# Patient Record
Sex: MALE | Race: WHITE | Employment: FULL TIME | URBAN - METROPOLITAN AREA
[De-identification: names, ages, dates, MRNs, and addresses within clinical notes are randomized per-mention and may not be internally consistent; named-entity substitution may affect disease eponyms.]

---

## 2017-02-13 ENCOUNTER — OFFICE VISIT (OUTPATIENT)
Dept: FAMILY MEDICINE CLINIC | Age: 52
End: 2017-02-13

## 2017-02-13 VITALS
HEIGHT: 67 IN | BODY MASS INDEX: 34.21 KG/M2 | TEMPERATURE: 97.9 F | HEART RATE: 166 BPM | SYSTOLIC BLOOD PRESSURE: 128 MMHG | WEIGHT: 218 LBS | RESPIRATION RATE: 20 BRPM | DIASTOLIC BLOOD PRESSURE: 84 MMHG

## 2017-02-13 DIAGNOSIS — J45.41 RAD (REACTIVE AIRWAY DISEASE), MODERATE PERSISTENT, WITH ACUTE EXACERBATION: ICD-10-CM

## 2017-02-13 DIAGNOSIS — R79.89 LOW VITAMIN D LEVEL: ICD-10-CM

## 2017-02-13 DIAGNOSIS — R06.02 SHORTNESS OF BREATH: Primary | ICD-10-CM

## 2017-02-13 DIAGNOSIS — G20 PARKINSON'S DISEASE (HCC): ICD-10-CM

## 2017-02-13 DIAGNOSIS — R13.12 OROPHARYNGEAL DYSPHAGIA: ICD-10-CM

## 2017-02-13 PROCEDURE — 99203 OFFICE O/P NEW LOW 30 MIN: CPT | Performed by: FAMILY MEDICINE

## 2017-02-13 RX ORDER — BUDESONIDE AND FORMOTEROL FUMARATE DIHYDRATE 80; 4.5 UG/1; UG/1
2 AEROSOL RESPIRATORY (INHALATION) 2 TIMES DAILY
Qty: 1 INHALER | Refills: 3 | Status: SHIPPED | OUTPATIENT
Start: 2017-02-13 | End: 2017-07-06

## 2017-02-13 RX ORDER — ALBUTEROL SULFATE 90 UG/1
2 AEROSOL, METERED RESPIRATORY (INHALATION) EVERY 6 HOURS PRN
Qty: 1 INHALER | Refills: 3 | Status: SHIPPED | OUTPATIENT
Start: 2017-02-13 | End: 2017-07-06 | Stop reason: SDUPTHER

## 2017-02-13 ASSESSMENT — ENCOUNTER SYMPTOMS
VOMITING: 0
COUGH: 1
EYE PAIN: 0
DIARRHEA: 0
BLOOD IN STOOL: 0
ABDOMINAL PAIN: 0
SHORTNESS OF BREATH: 1
NAUSEA: 0
WHEEZING: 1
TROUBLE SWALLOWING: 1
CONSTIPATION: 0

## 2017-02-16 ENCOUNTER — TELEPHONE (OUTPATIENT)
Dept: FAMILY MEDICINE CLINIC | Age: 52
End: 2017-02-16

## 2017-02-16 DIAGNOSIS — G47.9 SLEEP DIFFICULTIES: Primary | ICD-10-CM

## 2017-02-16 DIAGNOSIS — R06.02 SHORTNESS OF BREATH: ICD-10-CM

## 2017-03-03 RX ORDER — ROPINIROLE 12 MG/1
TABLET, FILM COATED, EXTENDED RELEASE ORAL
Qty: 30 TABLET | Refills: 3 | Status: SHIPPED | OUTPATIENT
Start: 2017-03-03 | End: 2017-05-15 | Stop reason: SDUPTHER

## 2017-03-03 RX ORDER — ROPINIROLE 12 MG/1
TABLET, FILM COATED, EXTENDED RELEASE ORAL
Refills: 0 | COMMUNITY
Start: 2017-01-14 | End: 2017-03-03 | Stop reason: SDUPTHER

## 2017-03-03 RX ORDER — BACLOFEN 10 MG/1
TABLET ORAL
Qty: 30 TABLET | Refills: 3 | Status: SHIPPED | OUTPATIENT
Start: 2017-03-03 | End: 2017-05-15 | Stop reason: SDUPTHER

## 2017-03-03 RX ORDER — ATORVASTATIN CALCIUM 40 MG/1
TABLET, FILM COATED ORAL
Qty: 30 TABLET | Refills: 3 | Status: SHIPPED | OUTPATIENT
Start: 2017-03-03 | End: 2017-05-15 | Stop reason: SDUPTHER

## 2017-03-03 RX ORDER — ATORVASTATIN CALCIUM 40 MG/1
TABLET, FILM COATED ORAL
Refills: 0 | COMMUNITY
Start: 2017-02-17 | End: 2017-03-03 | Stop reason: SDUPTHER

## 2017-03-03 RX ORDER — BACLOFEN 10 MG/1
TABLET ORAL
Refills: 0 | COMMUNITY
Start: 2017-01-14 | End: 2017-03-03 | Stop reason: SDUPTHER

## 2017-03-03 RX ORDER — CARBIDOPA, LEVODOPA AND ENTACAPONE 25; 200; 100 MG/1; MG/1; MG/1
1 TABLET, FILM COATED ORAL DAILY
Qty: 90 TABLET | Refills: 3 | Status: SHIPPED | OUTPATIENT
Start: 2017-03-03 | End: 2017-05-15 | Stop reason: SDUPTHER

## 2017-03-24 ENCOUNTER — TELEPHONE (OUTPATIENT)
Dept: FAMILY MEDICINE CLINIC | Age: 52
End: 2017-03-24

## 2017-03-27 ENCOUNTER — OFFICE VISIT (OUTPATIENT)
Dept: FAMILY MEDICINE CLINIC | Age: 52
End: 2017-03-27

## 2017-03-27 VITALS
SYSTOLIC BLOOD PRESSURE: 139 MMHG | HEIGHT: 66 IN | WEIGHT: 220.2 LBS | RESPIRATION RATE: 20 BRPM | HEART RATE: 73 BPM | TEMPERATURE: 97.5 F | BODY MASS INDEX: 35.39 KG/M2 | DIASTOLIC BLOOD PRESSURE: 93 MMHG

## 2017-03-27 DIAGNOSIS — G20 PARKINSON DISEASE (HCC): ICD-10-CM

## 2017-03-27 DIAGNOSIS — J30.9 ALLERGIC RHINITIS, UNSPECIFIED ALLERGIC RHINITIS TRIGGER, UNSPECIFIED RHINITIS SEASONALITY: Primary | ICD-10-CM

## 2017-03-27 PROCEDURE — 99214 OFFICE O/P EST MOD 30 MIN: CPT | Performed by: FAMILY MEDICINE

## 2017-03-27 RX ORDER — FLUTICASONE PROPIONATE 50 MCG
1 SPRAY, SUSPENSION (ML) NASAL DAILY
Qty: 1 BOTTLE | Refills: 3 | Status: SHIPPED | OUTPATIENT
Start: 2017-03-27 | End: 2017-07-06

## 2017-03-27 RX ORDER — LORATADINE 10 MG/1
20 TABLET ORAL DAILY
Qty: 60 TABLET | Refills: 5 | Status: SHIPPED | OUTPATIENT
Start: 2017-03-27 | End: 2017-07-06

## 2017-03-27 ASSESSMENT — ENCOUNTER SYMPTOMS
BLOOD IN STOOL: 0
ABDOMINAL PAIN: 0
CONSTIPATION: 0
VOMITING: 0
DIARRHEA: 0
COUGH: 0
NAUSEA: 0
TROUBLE SWALLOWING: 0
EYE PAIN: 0
SHORTNESS OF BREATH: 0

## 2017-03-28 PROBLEM — G20 PARKINSON DISEASE (HCC): Status: ACTIVE | Noted: 2017-03-28

## 2017-03-28 PROBLEM — J30.9 ALLERGIC RHINITIS: Status: ACTIVE | Noted: 2017-03-28

## 2017-03-29 ENCOUNTER — TELEPHONE (OUTPATIENT)
Dept: FAMILY MEDICINE CLINIC | Age: 52
End: 2017-03-29

## 2017-03-29 DIAGNOSIS — E55.9 VITAMIN D DEFICIENCY: ICD-10-CM

## 2017-03-29 DIAGNOSIS — E78.00 PURE HYPERCHOLESTEROLEMIA: Primary | ICD-10-CM

## 2017-03-29 RX ORDER — MULTIVIT-MIN/IRON/FOLIC ACID/K 18-600-40
CAPSULE ORAL
Qty: 60 CAPSULE | Refills: 5 | Status: SHIPPED | OUTPATIENT
Start: 2017-03-29 | End: 2017-05-15

## 2017-03-29 RX ORDER — OMEGA-3-ACID ETHYL ESTERS 1 G/1
2 CAPSULE, LIQUID FILLED ORAL 2 TIMES DAILY
Qty: 60 CAPSULE | Refills: 3 | Status: SHIPPED | OUTPATIENT
Start: 2017-03-29 | End: 2017-05-15 | Stop reason: SDUPTHER

## 2017-05-15 ENCOUNTER — OFFICE VISIT (OUTPATIENT)
Dept: FAMILY MEDICINE CLINIC | Age: 52
End: 2017-05-15

## 2017-05-15 VITALS
TEMPERATURE: 97.8 F | BODY MASS INDEX: 34.38 KG/M2 | RESPIRATION RATE: 12 BRPM | SYSTOLIC BLOOD PRESSURE: 137 MMHG | WEIGHT: 213 LBS | DIASTOLIC BLOOD PRESSURE: 63 MMHG | HEART RATE: 84 BPM

## 2017-05-15 DIAGNOSIS — E78.2 MIXED HYPERLIPIDEMIA: ICD-10-CM

## 2017-05-15 DIAGNOSIS — E55.9 VITAMIN D DEFICIENCY: ICD-10-CM

## 2017-05-15 DIAGNOSIS — R25.2 MUSCLE CRAMPS: ICD-10-CM

## 2017-05-15 DIAGNOSIS — G20 PARKINSON DISEASE (HCC): Primary | ICD-10-CM

## 2017-05-15 DIAGNOSIS — E78.00 PURE HYPERCHOLESTEROLEMIA: ICD-10-CM

## 2017-05-15 DIAGNOSIS — R06.02 SHORTNESS OF BREATH: ICD-10-CM

## 2017-05-15 PROCEDURE — 99214 OFFICE O/P EST MOD 30 MIN: CPT | Performed by: FAMILY MEDICINE

## 2017-05-15 RX ORDER — TOCOPHERSOLAN (VITAMIN E TPGS) 400/15ML
LIQUID (ML) ORAL
Qty: 90 TABLET | Refills: 5 | Status: SHIPPED | OUTPATIENT
Start: 2017-05-15 | End: 2017-07-06

## 2017-05-15 RX ORDER — OMEGA-3-ACID ETHYL ESTERS 1 G/1
2 CAPSULE, LIQUID FILLED ORAL 2 TIMES DAILY
Qty: 180 CAPSULE | Refills: 1 | Status: SHIPPED | OUTPATIENT
Start: 2017-05-15 | End: 2017-10-16 | Stop reason: SDUPTHER

## 2017-05-15 RX ORDER — CARBIDOPA, LEVODOPA AND ENTACAPONE 25; 200; 100 MG/1; MG/1; MG/1
1 TABLET, FILM COATED ORAL DAILY
Qty: 90 TABLET | Refills: 3 | Status: SHIPPED | OUTPATIENT
Start: 2017-05-15 | End: 2017-10-16 | Stop reason: SDUPTHER

## 2017-05-15 RX ORDER — ROPINIROLE 12 MG/1
TABLET, FILM COATED, EXTENDED RELEASE ORAL
Qty: 90 TABLET | Refills: 1 | Status: SHIPPED | OUTPATIENT
Start: 2017-05-15 | End: 2017-10-16 | Stop reason: SDUPTHER

## 2017-05-15 RX ORDER — BACLOFEN 10 MG/1
TABLET ORAL
Qty: 90 TABLET | Refills: 3 | Status: SHIPPED | OUTPATIENT
Start: 2017-05-15 | End: 2017-10-16 | Stop reason: SDUPTHER

## 2017-05-15 RX ORDER — ATORVASTATIN CALCIUM 20 MG/1
TABLET, FILM COATED ORAL
Qty: 90 TABLET | Refills: 1 | Status: SHIPPED | OUTPATIENT
Start: 2017-05-15 | End: 2017-10-16 | Stop reason: SDUPTHER

## 2017-05-16 ASSESSMENT — ENCOUNTER SYMPTOMS
TROUBLE SWALLOWING: 0
ABDOMINAL PAIN: 0
EYE PAIN: 0
CONSTIPATION: 0
COUGH: 0
VOMITING: 0
NAUSEA: 0
SHORTNESS OF BREATH: 0
BLOOD IN STOOL: 0
DIARRHEA: 0

## 2017-07-06 ENCOUNTER — OFFICE VISIT (OUTPATIENT)
Dept: PULMONOLOGY | Age: 52
End: 2017-07-06

## 2017-07-06 VITALS
TEMPERATURE: 98.1 F | SYSTOLIC BLOOD PRESSURE: 130 MMHG | BODY MASS INDEX: 32.58 KG/M2 | HEART RATE: 67 BPM | WEIGHT: 215 LBS | DIASTOLIC BLOOD PRESSURE: 84 MMHG | OXYGEN SATURATION: 98 % | HEIGHT: 68 IN

## 2017-07-06 DIAGNOSIS — G20 PARKINSON DISEASE (HCC): ICD-10-CM

## 2017-07-06 DIAGNOSIS — J45.41 RAD (REACTIVE AIRWAY DISEASE), MODERATE PERSISTENT, WITH ACUTE EXACERBATION: ICD-10-CM

## 2017-07-06 DIAGNOSIS — R06.02 SHORTNESS OF BREATH: ICD-10-CM

## 2017-07-06 DIAGNOSIS — J45.20 MILD INTERMITTENT ASTHMA WITHOUT COMPLICATION: Primary | ICD-10-CM

## 2017-07-06 PROCEDURE — 99204 OFFICE O/P NEW MOD 45 MIN: CPT | Performed by: INTERNAL MEDICINE

## 2017-07-06 RX ORDER — BUDESONIDE AND FORMOTEROL FUMARATE DIHYDRATE 80; 4.5 UG/1; UG/1
2 AEROSOL RESPIRATORY (INHALATION) 2 TIMES DAILY
COMMUNITY
End: 2017-11-09 | Stop reason: SDUPTHER

## 2017-07-06 RX ORDER — ALBUTEROL SULFATE 90 UG/1
2 AEROSOL, METERED RESPIRATORY (INHALATION) EVERY 6 HOURS PRN
Qty: 1 INHALER | Refills: 3 | Status: SHIPPED | OUTPATIENT
Start: 2017-07-06 | End: 2018-07-10 | Stop reason: SDUPTHER

## 2017-07-06 RX ORDER — DILTIAZEM HYDROCHLORIDE 60 MG/1
2 TABLET, FILM COATED ORAL 2 TIMES DAILY
Qty: 1 INHALER | Refills: 3
Start: 2017-07-06 | End: 2017-10-16 | Stop reason: CLARIF

## 2017-07-06 ASSESSMENT — ENCOUNTER SYMPTOMS
CHEST TIGHTNESS: 0
APNEA: 0
COUGH: 1
SHORTNESS OF BREATH: 0
STRIDOR: 0
PHOTOPHOBIA: 0
SINUS PRESSURE: 0
VOMITING: 0
ABDOMINAL PAIN: 0
WHEEZING: 1
CHOKING: 0
BLOOD IN STOOL: 0
VOICE CHANGE: 0
RHINORRHEA: 0
ABDOMINAL DISTENTION: 0
CONSTIPATION: 0
FACIAL SWELLING: 0
BACK PAIN: 0

## 2017-10-16 ENCOUNTER — OFFICE VISIT (OUTPATIENT)
Dept: FAMILY MEDICINE CLINIC | Age: 52
End: 2017-10-16
Payer: COMMERCIAL

## 2017-10-16 VITALS
SYSTOLIC BLOOD PRESSURE: 138 MMHG | WEIGHT: 220.4 LBS | HEART RATE: 80 BPM | TEMPERATURE: 97.5 F | BODY MASS INDEX: 33.4 KG/M2 | DIASTOLIC BLOOD PRESSURE: 88 MMHG | HEIGHT: 68 IN | OXYGEN SATURATION: 98 %

## 2017-10-16 DIAGNOSIS — E78.2 MIXED HYPERLIPIDEMIA: ICD-10-CM

## 2017-10-16 DIAGNOSIS — E55.9 VITAMIN D DEFICIENCY: ICD-10-CM

## 2017-10-16 DIAGNOSIS — G20 PARKINSON DISEASE (HCC): ICD-10-CM

## 2017-10-16 DIAGNOSIS — E78.00 PURE HYPERCHOLESTEROLEMIA: ICD-10-CM

## 2017-10-16 DIAGNOSIS — M16.10 HIP ARTHRITIS: Primary | ICD-10-CM

## 2017-10-16 PROCEDURE — 99214 OFFICE O/P EST MOD 30 MIN: CPT | Performed by: FAMILY MEDICINE

## 2017-10-16 RX ORDER — BACLOFEN 10 MG/1
TABLET ORAL
Qty: 90 TABLET | Refills: 3 | Status: SHIPPED | OUTPATIENT
Start: 2017-10-16 | End: 2018-07-10 | Stop reason: SDUPTHER

## 2017-10-16 RX ORDER — ROPINIROLE 12 MG/1
TABLET, FILM COATED, EXTENDED RELEASE ORAL
Qty: 90 TABLET | Refills: 1 | Status: SHIPPED | OUTPATIENT
Start: 2017-10-16 | End: 2018-07-30 | Stop reason: SDUPTHER

## 2017-10-16 RX ORDER — OMEGA-3-ACID ETHYL ESTERS 1 G/1
2 CAPSULE, LIQUID FILLED ORAL 2 TIMES DAILY
Qty: 180 CAPSULE | Refills: 1 | Status: SHIPPED | OUTPATIENT
Start: 2017-10-16 | End: 2018-02-13 | Stop reason: SDUPTHER

## 2017-10-16 RX ORDER — CARBIDOPA, LEVODOPA AND ENTACAPONE 25; 200; 100 MG/1; MG/1; MG/1
1 TABLET, FILM COATED ORAL DAILY
Qty: 90 TABLET | Refills: 3 | Status: SHIPPED | OUTPATIENT
Start: 2017-10-16 | End: 2018-04-03

## 2017-10-16 RX ORDER — ATORVASTATIN CALCIUM 20 MG/1
TABLET, FILM COATED ORAL
Qty: 90 TABLET | Refills: 1 | Status: SHIPPED | OUTPATIENT
Start: 2017-10-16 | End: 2018-02-13 | Stop reason: SDUPTHER

## 2017-10-16 ASSESSMENT — ENCOUNTER SYMPTOMS
DIARRHEA: 0
COUGH: 0
ABDOMINAL PAIN: 0
BLOOD IN STOOL: 0
NAUSEA: 0
VOMITING: 0
EYE PAIN: 0
SHORTNESS OF BREATH: 0
TROUBLE SWALLOWING: 0
CONSTIPATION: 0

## 2017-10-16 ASSESSMENT — PATIENT HEALTH QUESTIONNAIRE - PHQ9
SUM OF ALL RESPONSES TO PHQ QUESTIONS 1-9: 0
2. FEELING DOWN, DEPRESSED OR HOPELESS: 0
1. LITTLE INTEREST OR PLEASURE IN DOING THINGS: 0
SUM OF ALL RESPONSES TO PHQ9 QUESTIONS 1 & 2: 0

## 2017-10-16 NOTE — PATIENT INSTRUCTIONS
You may receive a survey about your visit with us today. The feedback from our patients helps us identify what is working well and where the service to all patients can be enhanced. Thank you! Will work on the water - will try to do the 30 oz container before lunch and then one before dinner    Will add the magnesium another time a day    Will then try to come off of the baclofen again    Will refer to Genny haute for the hip pain    Will get some blood work and see you back in 3 to 4 months    Can discuss the next apt for parkinsons at next visit    May come back on the lipitor next time - may stop this if the muscle cramps don't go away - will see what the LCL total is in March  Patient Education        Learning About Partial Hip Replacement Surgery  What is partial hip replacement? During partial hip replacement surgery, your doctor replaces the ball of your hip joint, but not the socket. The artificial part is made of metal, ceramic, or plastic. This type of surgery is done to repair certain types of hip fractures. How is partial hip replacement done? For open hip replacement surgery, your doctor makes a 6- to 10-inch cut (incision) on the side of your hip. You will be asleep during the surgery. Your doctor will then:  · Remove the damaged bone tissue and cartilage from the hip joint. · Replace the ball at the upper end of your thighbone (femur). Hip replacement can also be done with one or two smaller incisions. This is called minimally invasive surgery. Surgery may take 1 to 3 hours. What can you expect after partial hip replacement? You will stay in the hospital for a few days after surgery. Your rehabilitation program (rehab) will start at this time. When you go home, you will be able to move around with crutches or a walker. But you will need someone to help you at home for the next few weeks until your energy returns and you can move around better.  If there is no one to help you at home, you cannot move well enough to do your daily activities. · You want to keep golfing, biking, hiking, and doing other exercise. · You are at a healthy weight or will be able to lose weight and keep it off. Being too heavy puts strain on the hip joint. This could make a new hip wear out more quickly than it would if you were at a healthy weight. · You want to have the surgery before you lose too much of your ability to be active. If you are not able to stay active, strong, and flexible before the surgery, it can be harder to return to your normal activities after the surgery. Why would you not have hip replacement? · You are able to control your pain with medicine and other actions. These may include weight loss, changing your activities, and using a cane or crutches. You also could have shots of medicine to reduce pain in your hip. · You are still able to move well enough to work, shop, walk, and do other things. · You worry about doing physical therapy, or rehab, after the surgery. This takes 6 months. It also requires changes in how you sit, walk, and do other actions. · You have a medical problem that could mean you would not do well with anesthesia and surgery. · You may need another hip in 10 to 20 years. This is common with hip replacement. When should you call for help? Watch closely for changes in your health, and be sure to contact your doctor if:  · You want to learn more about hip replacement. · You want to have a hip replacement. Where can you learn more? Go to https://CodeRytezoë.[email or web address] org and sign in to your Topera account. Enter P339 in the [facility] box to learn more about \"Deciding About Total Hip Replacement. \"     If you do not have an account, please click on the \"Sign Up Now\" link. Current as of: March 21, 2017  Content Version: 11.3  © 8489-5226 [employer]. Care instructions adapted under license by [facility] ([facility]).  If you have questions about a medical condition or this instruction, always ask your healthcare professional. [address] any warranty or liability for your use of this information. Patient Education        Hip Replacement: Before Your Surgery  What is hip replacement surgery? Hip replacement surgery uses metal, ceramic, or plastic parts to replace the ball at the top of the thighbone (femur). In a total hip replacement, the doctor also replaces the hip socket. In a partial hip replacement, the socket is not replaced. For traditional surgery, your doctor will make a 6- to 10-inch cut (incision) on the side or the back of your hip. The surgery can also be done with one or two smaller cuts. This is called minimally invasive surgery. Another type of surgery is done through a small cut on the front (anterior) of the hip. Your doctor will talk with you which type of surgery might be best for you. You will likely stay in the hospital for 1 to 7 days after your surgery. Your physical therapy will start during this time. You may need physical therapy for several weeks after you leave the hospital. At home you'll keep doing the exercises you learned. It usually takes 3 to 6 months to get back to full activity. Your doctor will tell you when you can go back to work. This depends on the kind of surgery and the type of job you have. After you recover, you likely will have much less pain than before the surgery. You should be able to return to most of your normal activities. Your doctor may suggest that you avoid strenuous activities. These include running, tennis, and any type of skiing. You may have to take antibiotics before you have dental work or a medical procedure. This helps reduce the chance that your new hip will become infected. Always tell your caregivers that you have an artificial hip. Follow-up care is a key part of your treatment and safety.  Be sure to make and go to all appointments, and call your doctor picture ID. · The area for surgery is often marked to make sure there are no errors. · You will get antibiotics through the IV tube before surgery. This lowers the risk of an infection. · You will be kept comfortable and safe by your anesthesia provider. The anesthesia may make you sleep. Or it may just numb the area being worked on. · The surgery usually takes 1 to 3 hours. Going home  · Be sure you have someone to drive you home. Anesthesia and pain medicine make it unsafe for you to drive. · At first, you will need someone who can help you day and night. You can go home from the hospital if you have help at home and your recovery is going well. You can go to a rehabilitation center if you don't have help at home or if you need extra care. · You will be given more specific instructions about recovering from your surgery. They will cover things like diet, wound care, follow-up care, driving, and getting back to your normal routine. When should you call your doctor? · You have questions or concerns. · You don't understand how to prepare for your surgery. · You become ill before the surgery (such as fever, flu, or a cold). · You need to reschedule or have changed your mind about having the surgery. Where can you learn more? Go to https://Antenna.[email or web address] org and sign in to your Retroficiency account. Enter 99 [id number] in the [facility] box to learn more about \"Hip Replacement: Before Your Surgery. \"     If you do not have an account, please click on the \"Sign Up Now\" link. Current as of: March 21, 2017  Content Version: 11.3  © 8074-5232 [employer]. Care instructions adapted under license by [facility] ([facility]). If you have questions about a medical condition or this instruction, always ask your healthcare professional. [place] [address] any warranty or liability for your use of this information.

## 2017-10-16 NOTE — PROGRESS NOTES
[address]  [facility]  [address] [address]  Dept: [phone]  Dept Fax: [phone]: [phone]    Visit Date: 10/16/2017      Yaneth Quarles is a 46 y.o. male who presents today for:  Chief Complaint   Patient presents with    3 Month Follow-Up     Parkinson disease       ANTICIPATORY GUIDANCE : ADULT     WELL QUESTIONS  1. How many cups of caffeine do you drink per day? 3 pots of coffee a day   2. Do you exercise a minimum of 30 minutes per day, above normal activity?  does not have much time to exercise    3. Do you eat a minimum of 8-10 servings of fruits and vegetables per day? No, on average the patient consumes 0 servings of fruits and vegetables per day  4. Do you drink alcohol? 12 beers a week - depending on if he goes camping  5. How many cups of water do you drink per day?  Not a lot of water  EDUCATION PROVIDED  [x] Discussed and/or handout given on the below topic(s):  Caffeine Use: Limit to 2 cups per day (400mg of caffeine a day), Exercise: Ideal 30 minutes per day, above normal activity, Eating Fruits and Vegetables: Studies show 8-10 servings per day decrease BP, Alcohol: Ideal maximum of one cup per day for females and two cups per day for a male and 64 ounces a day is recommended             Goals     None          HPI:     HPI     The breathing has been really good - a little more wheeze - the Symbicort does make his throat raspy - he is rinsing his mouth out     He missed it for a few days - he had to use the rescue inhaler a lot more often without the symbicort    Has not had to use the rescue inhaler in weeks since not using the symbicort    He has not been tested for allergies - before not a reason    He did live in a house with smokers as a child    He does work around the [employer]    Will see pulmonology again in November and do the breathing test again     Muscle cramps are still pretty bad    Taking the magnesium daily - scleral icterus. Neck: Normal range of motion. Cardiovascular: Normal rate, regular rhythm and normal heart sounds. No murmur heard. Pulmonary/Chest: Effort normal and breath sounds normal.   Musculoskeletal: He exhibits no edema. Turning the foot out will cause a lot of pain in the hip   Neurological: He is alert and oriented to person, place, and time. No cranial nerve deficit. Skin: Skin is warm and dry. No rash noted. He is not diaphoretic. No erythema. Psychiatric: He has a normal mood and affect. His behavior is normal. Judgment and thought content normal.   Nursing note and vitals reviewed. No results found for: WBC, HGB, HCT, PLT, CHOL, TRIG, HDL, LDLDIRECT, LDLCALC, AST, NA, K, CL, CREATININE, BUN, CO2, TSH, PSA, INR, GLUF, LABA1C, LABMICR, LABGLOM, MG, CALCIUM, VITD25    Assessment:      1. Hip arthritis  External Referral To Orthopedic Surgery   2. Mixed hyperlipidemia  atorvastatin (LIPITOR) 20 MG tablet    Basic Metabolic Panel, Without Calcium    Calcium    CBC Auto Differential    Magnesium    Lipid Panel    Vitamins-Lipotropics (BALANCED B-100 COMPLEX CR) TBCR    DISCONTINUED: Vitamins-Lipotropics (BALANCED B-100 COMPLEX CR) TBCR   3. Parkinson disease (HCC)  baclofen (LIORESAL) 10 MG tablet    carbidopa-levodopa-entacapone (STALEVO 100) -200 MG TABS    Cholecalciferol (VITAMIN D3) 5000 units TABS    rOPINIRole (REQUIP XL) 12 MG TB24 extended release tablet    Vitamins-Lipotropics (BALANCED B-100 COMPLEX CR) TBCR    DISCONTINUED: Vitamins-Lipotropics (BALANCED B-100 COMPLEX CR) TBCR   4. Vitamin D deficiency  Cholecalciferol (VITAMIN D3) 5000 units TABS    PTH, Intact    Vitamin D 25 Hydroxy   5. Pure hypercholesterolemia  omega-3 acid ethyl esters (LOVAZA) 1 g capsule       Plan:       Will work on the water - will try to do the 30 oz container before lunch and then one before dinner    Will add the magnesium another time a day    Will then try to come off of the baclofen

## 2017-11-09 ENCOUNTER — OFFICE VISIT (OUTPATIENT)
Dept: PULMONOLOGY | Age: 52
End: 2017-11-09
Payer: COMMERCIAL

## 2017-11-09 VITALS
OXYGEN SATURATION: 97 % | HEART RATE: 77 BPM | WEIGHT: 220 LBS | TEMPERATURE: 97.8 F | HEIGHT: 68 IN | DIASTOLIC BLOOD PRESSURE: 75 MMHG | BODY MASS INDEX: 33.34 KG/M2 | SYSTOLIC BLOOD PRESSURE: 122 MMHG

## 2017-11-09 DIAGNOSIS — J45.20 MILD INTERMITTENT ASTHMA WITHOUT COMPLICATION: Primary | ICD-10-CM

## 2017-11-09 PROCEDURE — 99213 OFFICE O/P EST LOW 20 MIN: CPT | Performed by: INTERNAL MEDICINE

## 2017-11-09 RX ORDER — BUDESONIDE AND FORMOTEROL FUMARATE DIHYDRATE 80; 4.5 UG/1; UG/1
2 AEROSOL RESPIRATORY (INHALATION) 2 TIMES DAILY
Qty: 1 INHALER | Refills: 11 | Status: SHIPPED | OUTPATIENT
Start: 2017-11-09 | End: 2018-07-10 | Stop reason: SDUPTHER

## 2017-11-09 NOTE — PROGRESS NOTES
Subjective:      Patient ID: Isaac Fine is a 46 y.o. male.   Chief Complaint   Patient presents with    Asthma     4 month f/u with pft 10/27/17 [address] [place] [address]     CC: Asthma  HPI     Doing great, no visit to ED  No need for rescue inhaler  No nocturnal wheezing  No SOB  No sore throat  Wife has noticed a little change in voice quality    Comes with follow up Nadira Mendez         All other systems reviewed and are negative    Lung Nodule Screening     [] Qualifies    [x] Does not qualify   [] Declined   [] Completed  Past Medical History:   Diagnosis Date    Parkinson's disease ([facility] [address]     9 years ago     Past Surgical History:   Procedure Laterality Date    COLONOSCOPY  2017    HERNIA REPAIR  2016     Social History   Substance Use Topics    Smoking status: Never Smoker    Smokeless tobacco: Current User     Types: Chew    Alcohol use Yes      No Known Allergies   Family History   Problem Relation Age of Onset    Cancer Mother      breast x2, ovarian    Cancer Maternal Grandmother      unknown    Cancer Paternal Grandmother      unknown    Cancer Paternal Grandfather      unknown     Current Outpatient Prescriptions   Medication Sig Dispense Refill    budesonide-formoterol (SYMBICORT) 80-4.5 MCG/ACT AERO Inhale 2 puffs into the lungs 2 times daily 1 Inhaler 11    Spacer/Aero-Holding Chambers ELZBIETA 1 Device by Does not apply route daily as needed (1) 1 Device 0    atorvastatin (LIPITOR) 20 MG tablet TAKE 1 TABLET BY MOUTH EVERY DAY 90 tablet 1    baclofen (LIORESAL) 10 MG tablet TAKE 1 TABLET BY MOUTH EVERY DAY 90 tablet 3    carbidopa-levodopa-entacapone (STALEVO 100) -200 MG TABS Take 1 tablet by mouth daily 90 tablet 3    Cholecalciferol (VITAMIN D3) 5000 units TABS One daily 30 tablet 5    omega-3 acid ethyl esters (LOVAZA) 1 g capsule Take 2 capsules by mouth 2 times daily 180 capsule 1    rOPINIRole (REQUIP XL) 12 MG TB24 extended release tablet TAKE 1 TABLET BY MOUTH EVERY EVENING 90 tablet 1    Vitamins-Lipotropics (BALANCED B-100 COMPLEX CR) TBCR Take 1 tablet by mouth 2 times daily 120 tablet 5    albuterol sulfate HFA (VENTOLIN HFA) 108 (90 Base) MCG/ACT inhaler Inhale 2 puffs into the lungs every 6 hours as needed for Wheezing 1 Inhaler 3     No current facility-administered medications for this visit. LABS - none   /75 (Site: Right Arm, Position: Sitting, Cuff Size: Large Adult)   Pulse 77   Temp 97.8 °F (36.6 °C) (Tympanic)   Ht 5' 8.11\" (1.73 m)   Wt 220 lb (99.8 kg)   SpO2 97% Comment: RA at rest  BMI 33.34 kg/m²    Wt Readings from Last 3 Encounters:   17 220 lb (99.8 kg)   10/16/17 220 lb 6.4 oz (100 kg)   17 215 lb (97.5 kg)     Neck Circumference - 17 in; Mallampati Score - II    Objective:   Physical Exam   Mouth/Throat: Oropharynx is clear and moist. , no oral thrush   Cardiovascular: Normal rate, regular rhythm, normal heart sounds and intact distal pulses. Exam reveals no gallop and no friction rub. No murmur heard. Pulmonary/Chest: Effort normal.clear chest, no wheezes       PFTs:                                        Assessment:      1. Mild intermittent asthma without complication       Clinical presentation suggest late onset asthma responding quite well to ICS, Methacholine challenge test would be of no clinical/pratical benefit as doing quite well on Symbicort      Plan:      No orders of the defined types were placed in this encounter.      Orders Placed This Encounter   Medications    budesonide-formoterol (SYMBICORT) 80-4.5 MCG/ACT AERO     Sig: Inhale 2 puffs into the lungs 2 times daily     Dispense:  1 Inhaler     Refill:  11    Spacer/Aero-Holding Chambers ELZBIETA     Si Device by Does not apply route daily as needed (1)     Dispense:  1 Device     Refill:  0      RTC 12 mos with follow up spirometry, written information on Asthma provided

## 2018-02-10 LAB
CHOLESTEROL, TOTAL: 229 MG/DL
CHOLESTEROL/HDL RATIO: 5
HDLC SERPL-MCNC: 46 MG/DL (ref 35–70)
LDL CHOLESTEROL CALCULATED: 155 MG/DL (ref 0–160)
TRIGL SERPL-MCNC: 151 MG/DL
VLDLC SERPL CALC-MCNC: NORMAL MG/DL

## 2018-02-12 ENCOUNTER — OFFICE VISIT (OUTPATIENT)
Dept: FAMILY MEDICINE CLINIC | Age: 53
End: 2018-02-12
Payer: COMMERCIAL

## 2018-02-12 VITALS
WEIGHT: 214.4 LBS | BODY MASS INDEX: 32.49 KG/M2 | OXYGEN SATURATION: 96 % | RESPIRATION RATE: 10 BRPM | DIASTOLIC BLOOD PRESSURE: 84 MMHG | SYSTOLIC BLOOD PRESSURE: 125 MMHG | TEMPERATURE: 97.9 F | HEART RATE: 87 BPM | HEIGHT: 68 IN

## 2018-02-12 DIAGNOSIS — R06.02 SHORTNESS OF BREATH: ICD-10-CM

## 2018-02-12 DIAGNOSIS — J40 BRONCHITIS: Primary | ICD-10-CM

## 2018-02-12 DIAGNOSIS — G20 PARKINSON DISEASE (HCC): ICD-10-CM

## 2018-02-12 DIAGNOSIS — E78.00 PURE HYPERCHOLESTEROLEMIA: ICD-10-CM

## 2018-02-12 PROCEDURE — 99214 OFFICE O/P EST MOD 30 MIN: CPT | Performed by: FAMILY MEDICINE

## 2018-02-12 RX ORDER — AZITHROMYCIN 250 MG/1
250 TABLET, FILM COATED ORAL DAILY
Qty: 6 TABLET | Refills: 0 | Status: SHIPPED | OUTPATIENT
Start: 2018-02-12 | End: 2018-02-17

## 2018-02-12 ASSESSMENT — ENCOUNTER SYMPTOMS
NAUSEA: 0
DIARRHEA: 0
EYE PAIN: 0
VOMITING: 0
ABDOMINAL PAIN: 0
COUGH: 1
BLOOD IN STOOL: 0
CONSTIPATION: 0
TROUBLE SWALLOWING: 0
SHORTNESS OF BREATH: 0

## 2018-02-12 NOTE — PROGRESS NOTES
[facility]  Directions   Website  Address: [place], [address] [facility], [address] [place] [address]   Phone: [phone], LEFT [address]
days Two tablet the first day 6 tablet 0    budesonide-formoterol (SYMBICORT) 80-4.5 MCG/ACT AERO Inhale 2 puffs into the lungs 2 times daily 1 Inhaler 11    Spacer/Aero-Holding Chambers ELZBIETA 1 Device by Does not apply route daily as needed (1) 1 Device 0    atorvastatin (LIPITOR) 20 MG tablet TAKE 1 TABLET BY MOUTH EVERY DAY 90 tablet 1    baclofen (LIORESAL) 10 MG tablet TAKE 1 TABLET BY MOUTH EVERY DAY 90 tablet 3    carbidopa-levodopa-entacapone (STALEVO 100) -200 MG TABS Take 1 tablet by mouth daily 90 tablet 3    Cholecalciferol (VITAMIN D3) 5000 units TABS One daily 30 tablet 5    omega-3 acid ethyl esters (LOVAZA) 1 g capsule Take 2 capsules by mouth 2 times daily 180 capsule 1    rOPINIRole (REQUIP XL) 12 MG TB24 extended release tablet TAKE 1 TABLET BY MOUTH EVERY EVENING 90 tablet 1    Vitamins-Lipotropics (BALANCED B-100 COMPLEX CR) TBCR Take 1 tablet by mouth 2 times daily 120 tablet 5    albuterol sulfate HFA (VENTOLIN HFA) 108 (90 Base) MCG/ACT inhaler Inhale 2 puffs into the lungs every 6 hours as needed for Wheezing 1 Inhaler 3     No current facility-administered medications for this visit. No Known Allergies    Health Maintenance   Topic Date Due    Diabetes screen  03/15/2005    DTaP/Tdap/Td vaccine (1 - Tdap) 02/13/2018 (Originally 3/15/1984)    Hepatitis C screen  02/13/2018 (Originally 1965)    HIV screen  02/13/2018 (Originally 3/15/1980)    Colon cancer screen colonoscopy  02/13/2018 (Originally 3/15/2015)    Flu vaccine (1) 03/27/2018 (Originally 9/1/2017)    Pneumococcal med risk (1 of 1 - PPSV23) 03/27/2018 (Originally 3/15/1984)    Lipid screen  03/25/2022       Subjective:      Review of Systems   Constitutional: Negative for chills, fatigue and fever. HENT: Negative for ear pain, postnasal drip and trouble swallowing. Eyes: Negative for pain and visual disturbance. Respiratory: Positive for cough. Negative for shortness of breath.

## 2018-02-12 NOTE — PATIENT INSTRUCTIONS
You may receive a survey about your visit with us today. The feedback from our patients helps us identify what is working well and where the service to all patients can be enhanced. Thank you! Will wait for the bloodwork and go over it on the phone    Will stop the lipitor for now - and can restart     Can start some tumeric - or curcurmin daily    Will keep trying to drink lots of water    Will recheck the cholesterol in 2 to 3 months   Patient Education        Learning About High Cholesterol  What is high cholesterol? Cholesterol is a type of fat in your blood. It is needed for many body functions, such as making new cells. Cholesterol is made by your body. It also comes from food you eat. If you have too much cholesterol, it starts to build up in your arteries. This is called hardening of the arteries, or atherosclerosis. High cholesterol raises your risk of a heart attack and stroke. There are different types of cholesterol. LDL is the \"bad\" cholesterol. High LDL can raise your risk for heart disease, heart attack, and stroke. HDL is the \"good\" cholesterol. High HDL is linked with a lower risk for heart disease, heart attack, and stroke. Your cholesterol levels help your doctor find out your risk for having a heart attack or stroke. How can you prevent high cholesterol? A heart-healthy lifestyle can help you prevent high cholesterol. This lifestyle helps lower your risk for a heart attack and stroke. · Eat heart-healthy foods. ¨ Eat fruits, vegetables, whole grains (like oatmeal), dried beans and peas, nuts and seeds, soy products (like tofu), and fat-free or low-fat dairy products. ¨ Replace butter, margarine, and hydrogenated or partially hydrogenated oils with olive and canola oils. (Canola oil margarine without trans fat is fine.)  ¨ Replace red meat with fish, poultry, and soy protein (like tofu). ¨ Limit processed and packaged foods like chips, crackers, and cookies. · Be active.  Exercise

## 2018-02-13 ENCOUNTER — TELEPHONE (OUTPATIENT)
Dept: FAMILY MEDICINE CLINIC | Age: 53
End: 2018-02-13

## 2018-02-13 DIAGNOSIS — M16.10 HIP ARTHRITIS: Primary | ICD-10-CM

## 2018-02-13 DIAGNOSIS — E78.2 MIXED HYPERLIPIDEMIA: ICD-10-CM

## 2018-02-13 DIAGNOSIS — E78.00 PURE HYPERCHOLESTEROLEMIA: ICD-10-CM

## 2018-02-13 RX ORDER — OMEGA-3-ACID ETHYL ESTERS 1 G/1
2 CAPSULE, LIQUID FILLED ORAL 2 TIMES DAILY
Qty: 180 CAPSULE | Refills: 1 | Status: SHIPPED | OUTPATIENT
Start: 2018-02-13 | End: 2019-03-28 | Stop reason: SDUPTHER

## 2018-02-13 RX ORDER — ATORVASTATIN CALCIUM 20 MG/1
TABLET, FILM COATED ORAL
Qty: 90 TABLET | Refills: 1 | Status: SHIPPED | OUTPATIENT
Start: 2018-02-13 | End: 2018-07-30

## 2018-02-13 RX ORDER — ADHESIVE TAPE 3"X 2.3 YD
TAPE, NON-MEDICATED TOPICAL
Qty: 60 TABLET | Refills: 5 | Status: SHIPPED | OUTPATIENT
Start: 2018-02-13 | End: 2018-07-30

## 2018-07-10 ENCOUNTER — TELEPHONE (OUTPATIENT)
Dept: FAMILY MEDICINE CLINIC | Age: 53
End: 2018-07-10

## 2018-07-10 DIAGNOSIS — J45.41 RAD (REACTIVE AIRWAY DISEASE), MODERATE PERSISTENT, WITH ACUTE EXACERBATION: ICD-10-CM

## 2018-07-10 DIAGNOSIS — J45.20 MILD INTERMITTENT ASTHMA WITHOUT COMPLICATION: ICD-10-CM

## 2018-07-10 DIAGNOSIS — R06.02 SHORTNESS OF BREATH: ICD-10-CM

## 2018-07-10 DIAGNOSIS — G20 PARKINSON DISEASE (HCC): ICD-10-CM

## 2018-07-10 RX ORDER — ALBUTEROL SULFATE 90 UG/1
2 AEROSOL, METERED RESPIRATORY (INHALATION) EVERY 6 HOURS PRN
Qty: 1 INHALER | Refills: 3 | Status: SHIPPED | OUTPATIENT
Start: 2018-07-10 | End: 2019-03-28 | Stop reason: SDUPTHER

## 2018-07-10 RX ORDER — BUDESONIDE AND FORMOTEROL FUMARATE DIHYDRATE 80; 4.5 UG/1; UG/1
2 AEROSOL RESPIRATORY (INHALATION) 2 TIMES DAILY
Qty: 1 INHALER | Refills: 11 | Status: SHIPPED | OUTPATIENT
Start: 2018-07-10 | End: 2018-12-04 | Stop reason: DRUGHIGH

## 2018-07-10 RX ORDER — BACLOFEN 10 MG/1
TABLET ORAL
Qty: 90 TABLET | Refills: 3 | Status: SHIPPED | OUTPATIENT
Start: 2018-07-10 | End: 2018-07-30

## 2018-07-11 NOTE — TELEPHONE ENCOUNTER
Patient needs their medications refilled - had to reschedule the regular appointment    Sent in the inhalers and th baclofen as requested

## 2018-07-30 ENCOUNTER — OFFICE VISIT (OUTPATIENT)
Dept: FAMILY MEDICINE CLINIC | Age: 53
End: 2018-07-30
Payer: COMMERCIAL

## 2018-07-30 VITALS
BODY MASS INDEX: 34.4 KG/M2 | HEIGHT: 68 IN | SYSTOLIC BLOOD PRESSURE: 138 MMHG | TEMPERATURE: 97.8 F | DIASTOLIC BLOOD PRESSURE: 88 MMHG | WEIGHT: 227 LBS | HEART RATE: 76 BPM | OXYGEN SATURATION: 99 % | RESPIRATION RATE: 12 BRPM

## 2018-07-30 DIAGNOSIS — Z13.1 ENCOUNTER FOR SCREENING FOR DIABETES MELLITUS: ICD-10-CM

## 2018-07-30 DIAGNOSIS — G20 PARKINSON DISEASE (HCC): ICD-10-CM

## 2018-07-30 DIAGNOSIS — Z23 NEED FOR PROPHYLACTIC VACCINATION AGAINST STREPTOCOCCUS PNEUMONIAE (PNEUMOCOCCUS): ICD-10-CM

## 2018-07-30 DIAGNOSIS — Z23 NEED FOR PROPHYLACTIC VACCINATION AGAINST DIPHTHERIA-TETANUS-PERTUSSIS (DTP): ICD-10-CM

## 2018-07-30 DIAGNOSIS — Z23 NEED FOR PROPHYLACTIC VACCINATION AND INOCULATION AGAINST VARICELLA: ICD-10-CM

## 2018-07-30 DIAGNOSIS — R79.89 LOW VITAMIN D LEVEL: ICD-10-CM

## 2018-07-30 DIAGNOSIS — E78.00 PURE HYPERCHOLESTEROLEMIA: ICD-10-CM

## 2018-07-30 DIAGNOSIS — Z11.59 ENCOUNTER FOR HEPATITIS C SCREENING TEST FOR LOW RISK PATIENT: Primary | ICD-10-CM

## 2018-07-30 DIAGNOSIS — T73.3XXA FATIGUE DUE TO EXCESSIVE EXERTION, INITIAL ENCOUNTER: ICD-10-CM

## 2018-07-30 PROCEDURE — 99214 OFFICE O/P EST MOD 30 MIN: CPT | Performed by: FAMILY MEDICINE

## 2018-07-30 RX ORDER — ADHESIVE TAPE 3"X 2.3 YD
TAPE, NON-MEDICATED TOPICAL
Qty: 60 TABLET | Refills: 5 | COMMUNITY
Start: 2018-07-30 | End: 2019-03-28 | Stop reason: SDUPTHER

## 2018-07-30 RX ORDER — CARBIDOPA, LEVODOPA AND ENTACAPONE 25; 200; 100 MG/1; MG/1; MG/1
1 TABLET, FILM COATED ORAL 2 TIMES DAILY
Qty: 180 TABLET | Refills: 1 | Status: SHIPPED | OUTPATIENT
Start: 2018-07-30 | End: 2019-03-28 | Stop reason: SDUPTHER

## 2018-07-30 RX ORDER — ROPINIROLE 12 MG/1
TABLET, FILM COATED, EXTENDED RELEASE ORAL
Qty: 90 TABLET | Refills: 1 | Status: SHIPPED | OUTPATIENT
Start: 2018-07-30 | End: 2019-02-06 | Stop reason: SDUPTHER

## 2018-07-30 ASSESSMENT — ENCOUNTER SYMPTOMS
ABDOMINAL PAIN: 0
VOMITING: 0
EYE PAIN: 0
TROUBLE SWALLOWING: 0
SHORTNESS OF BREATH: 0
DIARRHEA: 0
BLOOD IN STOOL: 0
CONSTIPATION: 0
NAUSEA: 0
COUGH: 0

## 2018-07-30 NOTE — PROGRESS NOTES
He use to do a lot more movement at work - and can't golf any more    He is taking the B and the fish oil twice a day    He takes the other      The parkinsons is going well - he does hits the wall sooner than he did before and feels really old and needs to go to bed - he gets a blank stare on his face    Takes the stalevo twice a day unless he is going away for the weekend - and then can take 3 times a day    On both the inhalers - he uses the albuterol a few times a week - still on the pulmicort    Has plegm he coughs up in the morning - will sometimes cough during the day  No question data found. Past Medical History:   Diagnosis Date    Parkinson's disease ([facility] [address]     9 years ago      Past Surgical History:   Procedure Laterality Date    COLONOSCOPY  2017    HERNIA REPAIR  2016     Family History   Problem Relation Age of Onset    Cancer Mother         breast x2, ovarian    Cancer Maternal Grandmother         unknown    Cancer Paternal Grandmother         unknown    Cancer Paternal Grandfather         unknown     Social History   Substance Use Topics    Smoking status: Never Smoker    Smokeless tobacco: Current User     Types: Chew    Alcohol use Yes      Current Outpatient Prescriptions   Medication Sig Dispense Refill    zoster recombinant adjuvanted vaccine (SHINGRIX) 50 MCG SUSR injection Inject 0.5 mLs into the muscle once for 1 dose 50 MCG IM then repeat 2-6 months.  1 each 1    rOPINIRole (REQUIP XL) 12 MG TB24 extended release tablet TAKE 1 TABLET BY MOUTH EVERY EVENING 90 tablet 1    carbidopa-levodopa-entacapone (STALEVO 100) -200 MG TABS Take 1 tablet by mouth 2 times daily 180 tablet 1    Magnesium Oxide 200 MG TABS Twice a day 60 tablet 5    albuterol sulfate HFA (VENTOLIN HFA) 108 (90 Base) MCG/ACT inhaler Inhale 2 puffs into the lungs every 6 hours as needed for Wheezing 1 Inhaler 3    budesonide-formoterol (SYMBICORT) 80-4.5 MCG/ACT AERO Inhale 2 puffs into the lungs 2 times daily 1 Inhaler 11    Cholecalciferol (VITAMIN D3) 5000 units TABS One capsule daily 30 tablet 5    omega-3 acid ethyl esters (LOVAZA) 1 g capsule Take 2 capsules by mouth 2 times daily 180 capsule 1    Spacer/Aero-Holding Chambers ELZBIETA 1 Device by Does not apply route daily as needed (1) 1 Device 0    Vitamins-Lipotropics (BALANCED B-100 COMPLEX CR) TBCR Take 1 tablet by mouth 2 times daily 120 tablet 5     No current facility-administered medications for this visit. No Known Allergies    Health Maintenance   Topic Date Due    Hepatitis C screen  1965    HIV screen  03/15/1980    DTaP/Tdap/Td vaccine (1 - Tdap) 03/15/1984    Pneumococcal med risk (1 of 1 - PPSV23) 03/15/1984    Diabetes screen  03/15/2005    Shingles Vaccine (1 of 2 - 2 Dose Series) 03/15/2015    Colon cancer screen colonoscopy  03/15/2015    Flu vaccine (1) 09/01/2018    Lipid screen  02/10/2023       Subjective:      Review of Systems   Constitutional: Negative for chills, fatigue and fever. HENT: Positive for postnasal drip. Negative for ear pain and trouble swallowing. Eyes: Negative for pain and visual disturbance. Respiratory: Negative for cough and shortness of breath. Cardiovascular: Negative for chest pain and palpitations. Gastrointestinal: Negative for abdominal pain, blood in stool, constipation, diarrhea, nausea and vomiting. Skin: Negative for rash. Neurological: Negative for headaches. Objective:     Vitals:    07/30/18 1555 07/30/18 1602   BP: (!) 150/96 138/88   Site: Right Arm Left Arm   Position: Sitting Sitting   Cuff Size: Medium Adult Large Adult   Pulse: 76    Resp: 12    Temp: 97.8 °F (36.6 °C)    TempSrc: Oral    SpO2: 99%    Weight: 227 lb (103 kg)    Height: 5' 8.11\" (1.73 m)        Body mass index is 34.4 kg/m².     Wt Readings from Last 3 Encounters:   07/30/18 227 lb (103 kg)   02/12/18 214 lb 6.4 oz (97.3 kg)   11/09/17 220 lb (99.8 kg)     BP Readings from Last 3 Encounters:   07/30/18 138/88   02/12/18 125/84   11/09/17 122/75       Physical Exam   Constitutional: He is oriented to person, place, and time. He appears well-developed and well-nourished. No distress. HENT:   Head: Normocephalic and atraumatic. Right Ear: External ear normal.   Left Ear: External ear normal.   Eyes: Conjunctivae and EOM are normal. Right eye exhibits no discharge. Left eye exhibits no discharge. No scleral icterus. Neck: Normal range of motion. Cardiovascular: Normal rate, regular rhythm and normal heart sounds. No murmur heard. Pulmonary/Chest: Effort normal and breath sounds normal.   Musculoskeletal: He exhibits no edema. Neurological: He is alert and oriented to person, place, and time. No cranial nerve deficit. Skin: Skin is warm and dry. No rash noted. He is not diaphoretic. No erythema. Psychiatric: He has a normal mood and affect. His behavior is normal. Judgment and thought content normal.   Nursing note and vitals reviewed. Lab Results   Component Value Date    CHOL 229 02/10/2018    TRIG 151 02/10/2018    HDL 46 02/10/2018    LDLCALC 155 02/10/2018       Imaging Results:    No results found. Assessment:      Diagnosis Orders   1. Encounter for hepatitis C screening test for low risk patient  Hepatitis C Antibody   2. Encounter for screening for diabetes mellitus     3. Need for prophylactic vaccination against Streptococcus pneumoniae (pneumococcus)  Pneumococcal polysaccharide vaccine 23-valent PPSV23   4. Need for prophylactic vaccination against diphtheria-tetanus-pertussis (DTP)     5. Need for prophylactic vaccination and inoculation against varicella  zoster recombinant adjuvanted vaccine (SHINGRIX) 50 MCG SUSR injection   6. Parkinson disease (HCC)  rOPINIRole (REQUIP XL) 12 MG TB24 extended release tablet    Magnesium    CBC Auto Differential    Calcium   7. Pure hypercholesterolemia  Lipid Panel    CBC Auto Differential    Calcium   8.  Fatigue due to excessive exertion, initial encounter  CBC Auto Differential    Calcium    Basic Metabolic Panel, Without Calcium    DHEA    DHEA-Sulfate   9. Low vitamin D level  Vitamin D 25 Hydroxy       Plan: Will work on the water again - 30 oz before lunch and 30 oz before dinner if you can    Can walk at night    Can look into the ketogenic diet    Please add a B vitamin to your regimen - it should be a B complex that is time released but NOT a super complex as that adds vitamin C and you will eliminate the B vitamins more quickly and they won't be around in your system long enough to help. Will stay on the tumeric    Will add back the mag oxide twice a day    Will work on the [employer] [address] - may want to take a break for a few weeks    Can try some claritin 20mg a day for a month to see if the phlegm goes away       Return in about 3 months (around 10/30/2018). Orders Placed:  Orders Placed This Encounter   Procedures    Pneumococcal polysaccharide vaccine 23-valent PPSV23    Hepatitis C Antibody    Vitamin D 25 Hydroxy    Magnesium    Lipid Panel    CBC Auto Differential    Calcium    Basic Metabolic Panel, Without Calcium    DHEA    DHEA-Sulfate     Medications Prescribed:  Orders Placed This Encounter   Medications    zoster recombinant adjuvanted vaccine (SHINGRIX) 50 MCG SUSR injection     Sig: Inject 0.5 mLs into the muscle once for 1 dose 50 MCG IM then repeat 2-6 months.      Dispense:  1 each     Refill:  1    rOPINIRole (REQUIP XL) 12 MG TB24 extended release tablet     Sig: TAKE 1 TABLET BY MOUTH EVERY EVENING     Dispense:  90 tablet     Refill:  1    carbidopa-levodopa-entacapone (STALEVO 100) -200 MG TABS     Sig: Take 1 tablet by mouth 2 times daily     Dispense:  180 tablet     Refill:  1    Magnesium Oxide 200 MG TABS     Sig: Twice a day     Dispense:  60 tablet     Refill:  5       Future Appointments  Date Time Provider Hao Moreno   11/15/2018 3:40 PM Maru Lindsay

## 2018-10-27 LAB
BUN BLDV-MCNC: NORMAL MG/DL
CALCIUM SERPL-MCNC: NORMAL MG/DL
CHLORIDE BLD-SCNC: NORMAL MMOL/L
CHOLESTEROL, TOTAL: 228 MG/DL
CHOLESTEROL/HDL RATIO: 4.3
CO2: NORMAL MMOL/L
CREAT SERPL-MCNC: 0.86 MG/DL
GFR CALCULATED: NORMAL
GLUCOSE BLD-MCNC: NORMAL MG/DL
HDLC SERPL-MCNC: 53 MG/DL (ref 35–70)
LDL CHOLESTEROL CALCULATED: 148 MG/DL (ref 0–160)
POTASSIUM SERPL-SCNC: 4.4 MMOL/L
SODIUM BLD-SCNC: NORMAL MMOL/L
TRIGL SERPL-MCNC: 144 MG/DL
VLDLC SERPL CALC-MCNC: NORMAL MG/DL

## 2018-10-29 ENCOUNTER — OFFICE VISIT (OUTPATIENT)
Dept: FAMILY MEDICINE CLINIC | Age: 53
End: 2018-10-29
Payer: COMMERCIAL

## 2018-10-29 VITALS
TEMPERATURE: 97.5 F | SYSTOLIC BLOOD PRESSURE: 136 MMHG | HEART RATE: 66 BPM | BODY MASS INDEX: 34.34 KG/M2 | DIASTOLIC BLOOD PRESSURE: 88 MMHG | WEIGHT: 226.6 LBS | HEIGHT: 68 IN | OXYGEN SATURATION: 99 %

## 2018-10-29 DIAGNOSIS — R06.02 SHORTNESS OF BREATH: ICD-10-CM

## 2018-10-29 DIAGNOSIS — E55.9 VITAMIN D DEFICIENCY: ICD-10-CM

## 2018-10-29 DIAGNOSIS — E78.00 PURE HYPERCHOLESTEROLEMIA: ICD-10-CM

## 2018-10-29 DIAGNOSIS — K21.9 GASTROESOPHAGEAL REFLUX DISEASE, ESOPHAGITIS PRESENCE NOT SPECIFIED: ICD-10-CM

## 2018-10-29 DIAGNOSIS — G20 PARKINSON DISEASE (HCC): Primary | ICD-10-CM

## 2018-10-29 PROCEDURE — 99213 OFFICE O/P EST LOW 20 MIN: CPT | Performed by: FAMILY MEDICINE

## 2018-10-29 RX ORDER — OMEPRAZOLE 20 MG/1
20 TABLET, DELAYED RELEASE ORAL DAILY
Qty: 30 TABLET | Refills: 3 | Status: SHIPPED | OUTPATIENT
Start: 2018-10-29 | End: 2019-03-28 | Stop reason: SDUPTHER

## 2018-10-29 ASSESSMENT — ENCOUNTER SYMPTOMS
DIARRHEA: 0
TROUBLE SWALLOWING: 0
VOMITING: 0
EYE PAIN: 0
WHEEZING: 1
COUGH: 1
NAUSEA: 0
BLOOD IN STOOL: 0
SHORTNESS OF BREATH: 1
ABDOMINAL PAIN: 0
CONSTIPATION: 0

## 2018-10-29 ASSESSMENT — PATIENT HEALTH QUESTIONNAIRE - PHQ9
SUM OF ALL RESPONSES TO PHQ QUESTIONS 1-9: 0
1. LITTLE INTEREST OR PLEASURE IN DOING THINGS: 0
SUM OF ALL RESPONSES TO PHQ QUESTIONS 1-9: 0
2. FEELING DOWN, DEPRESSED OR HOPELESS: 0
SUM OF ALL RESPONSES TO PHQ9 QUESTIONS 1 & 2: 0

## 2018-10-29 NOTE — PROGRESS NOTES
[address] [facility]. [facility]  Dept: [phone]  Dept Fax: [phone]  Loc: [address] [place] [address] Maintenance Due   Topic Date Due    Hepatitis C screen  1965 - today    HIV screen  03/15/1980 - today    DTaP/Tdap/Td vaccine (1 - Tdap) 03/15/1984 declined    Pneumococcal med risk (1 of 1 - PPSV23) 03/15/1984 today    Diabetes screen  03/15/2005 today    Shingles Vaccine (1 of 2 - 2 Dose Series) 03/15/2015  today    Colon cancer screen colonoscopy  03/15/2015 - [facility] for 10 years    Flu vaccine (1) 09/01/2018 declined           Patient:  Chirag Hardwick  Visit Date: 10/29/2018    ANTICIPATORY GUIDANCE : ADULT     WELL QUESTIONS  1. How many cups of caffeine do you drink per day?   25 cups of coffee   2. Do you exercise a minimum of 30 minutes per day, above normal activity? Yes    3. Do you eat a minimum of 8-10 servings of fruits and vegetables per day? No, on average the patient consumes 0 servings of fruits and vegetables per day  4. Do you drink alcohol? Yes, on average the patient consumes 12 pack per week cups of alcohol daily  5.  How many oz of water do you drink per day?  (64 oz per day recommended) 2 bottles  EDUCATION PROVIDED  Discussed and/or Handout Given on the following items:            [] Caffeine Use: Limit to 2 cups per day   (400mg of caffeine a day)     [] Exercise: Ideal 30 minutes per day, above normal activity              [] Eating Fruits and Vegetables: Studies show 8-10 servings per day decrease BP  [] Alcohol: Ideal maximum of one cup per day for females and two cups per day for a male

## 2018-10-29 NOTE — PROGRESS NOTES
Annel Ledbetter is a 48 y.o. male who presents today for:  Chief Complaint   Patient presents with    Annual Exam       Goals      Exercise 3x per week (30 min per time)           HPI:     HPI    He did see Deena in [employer]     Uses the one twice a day - the emergency inhaler was every few weeks    The parkinsons is going ok    Cramps are next to nothing - taking the vitamins    He does tire easily     after  Work he does get tired - does get a little worse lately - working 80 hours a week    The psa was not elevated - had a prostate nodule    He does feel th estomach contents come up as soon as he lays down - last scope was years ago for the EGD        No question data found.     Past Medical History:   Diagnosis Date    Parkinson's disease ([address]     9 years ago      Past Surgical History:   Procedure Laterality Date    COLONOSCOPY  2017    HERNIA REPAIR  2016     Family History   Problem Relation Age of Onset    Cancer Mother         breast x2, ovarian    Cancer Maternal Grandmother         unknown    Cancer Paternal Grandmother         unknown    Cancer Paternal Grandfather         unknown     Social History   Substance Use Topics    Smoking status: Never Smoker    Smokeless tobacco: Current User     Types: Chew    Alcohol use Yes      Current Outpatient Prescriptions   Medication Sig Dispense Refill    omeprazole (PRILOSEC OTC) 20 MG tablet Take 1 tablet by mouth daily 30 tablet 3    rOPINIRole (REQUIP XL) 12 MG TB24 extended release tablet TAKE 1 TABLET BY MOUTH EVERY EVENING 90 tablet 1    carbidopa-levodopa-entacapone (STALEVO 100) -200 MG TABS Take 1 tablet by mouth 2 times daily 180 tablet 1    Magnesium Oxide 200 MG TABS Twice a day 60 tablet 5    albuterol sulfate HFA (VENTOLIN HFA) 108 (90 Base) MCG/ACT inhaler Inhale 2 puffs into the lungs every 6 hours as needed for Wheezing 1 Inhaler 3    budesonide-formoterol (SYMBICORT) 80-4.5 MCG/ACT AERO Inhale 2 puffs into the lungs 2

## 2018-10-29 NOTE — PATIENT INSTRUCTIONS
1. You may receive a survey about your visit with us today. The feedback from our patients helps us identify what is working well and where the service to all patients can be enhanced. Thank you! 2. Please bring in ALL medications BOTTLES, including inhalers, herbal supplements, over the counter, prescribed & non-prescribed medicine. The office would like actual medication bottles and a list.  3. Please note our OFFICE POLICIES:  a. Prior to getting your labs drawn, please check with your insurance company for benefits and eligibility of lab services. Often, insurance companies cover certain tests for preventative visits only. It is patient's responsibility to see what is covered. b. We are unable to change a diagnosis after the test has been performed. c. Lab orders will not be re-printed. Please hold onto your original lab orders and take them to your lab to be completed. d. If you no show your schedule appointment three times, you be dismissed from this practice. e. Nasreen Hawks must be completed 24 hours prior to your schedule appointment. 4. If the list below has been completed, PLEASE FAX RECORDS TO OUR OFFICE @ [phone]. Once the records have been received we will update your records at our office. Health Maintenance Due   Topic Date Due    Hepatitis C screen  1965    HIV screen  03/15/1980    DTaP/Tdap/Td vaccine (1 - Tdap) 03/15/1984    Pneumococcal med risk (1 of 1 - PPSV23) 03/15/1984    Diabetes screen  03/15/2005    Shingles Vaccine (1 of 2 - 2 Dose Series) 03/15/2015    Colon cancer screen colonoscopy  03/15/2015    Flu vaccine (1) 09/01/2018       Schedule your 2018 flu vaccine with Dr. Michael Link call [phone]!   [facility] [place], [facility], for anyone 9 years or older   [address]   Every Monday   8:00am-11:00am and 1:00pm-3:00pm          Will get the pneumonia shot    Goal to put water int he car to drink on the way to work    Can get some

## 2018-10-30 ENCOUNTER — TELEPHONE (OUTPATIENT)
Dept: FAMILY MEDICINE CLINIC | Age: 53
End: 2018-10-30

## 2018-12-04 ENCOUNTER — OFFICE VISIT (OUTPATIENT)
Dept: PULMONOLOGY | Age: 53
End: 2018-12-04
Payer: COMMERCIAL

## 2018-12-04 VITALS
HEIGHT: 68 IN | HEART RATE: 79 BPM | WEIGHT: 234 LBS | BODY MASS INDEX: 35.46 KG/M2 | SYSTOLIC BLOOD PRESSURE: 132 MMHG | RESPIRATION RATE: 18 BRPM | TEMPERATURE: 97.6 F | DIASTOLIC BLOOD PRESSURE: 86 MMHG | OXYGEN SATURATION: 98 %

## 2018-12-04 DIAGNOSIS — G47.19 EXCESSIVE DAYTIME SLEEPINESS: ICD-10-CM

## 2018-12-04 DIAGNOSIS — J45.40 MODERATE PERSISTENT ASTHMA WITHOUT COMPLICATION: Primary | ICD-10-CM

## 2018-12-04 DIAGNOSIS — E66.9 OBESITY (BMI 35.0-39.9 WITHOUT COMORBIDITY): ICD-10-CM

## 2018-12-04 DIAGNOSIS — F17.220 CHEWING TOBACCO NICOTINE DEPENDENCE WITHOUT COMPLICATION: ICD-10-CM

## 2018-12-04 DIAGNOSIS — Z78.9 EXCESSIVE CAFFEINE INTAKE: ICD-10-CM

## 2018-12-04 PROCEDURE — 99214 OFFICE O/P EST MOD 30 MIN: CPT | Performed by: NURSE PRACTITIONER

## 2018-12-04 PROCEDURE — 95012 NITRIC OXIDE EXP GAS DETER: CPT | Performed by: NURSE PRACTITIONER

## 2018-12-04 RX ORDER — BUDESONIDE AND FORMOTEROL FUMARATE DIHYDRATE 160; 4.5 UG/1; UG/1
2 AEROSOL RESPIRATORY (INHALATION) 2 TIMES DAILY
Qty: 1 INHALER | Refills: 11 | Status: SHIPPED | OUTPATIENT
Start: 2018-12-04 | End: 2019-03-04 | Stop reason: ALTCHOICE

## 2018-12-04 RX ORDER — PREDNISONE 20 MG/1
40 TABLET ORAL DAILY
Qty: 10 TABLET | Refills: 0 | Status: SHIPPED | OUTPATIENT
Start: 2018-12-04 | End: 2018-12-09

## 2018-12-04 ASSESSMENT — ENCOUNTER SYMPTOMS
EYES NEGATIVE: 1
SHORTNESS OF BREATH: 0
TROUBLE SWALLOWING: 0
CHEST TIGHTNESS: 1
SINUS PRESSURE: 0
VOICE CHANGE: 0
STRIDOR: 0
WHEEZING: 1
BACK PAIN: 0
COUGH: 0
DIARRHEA: 0
NAUSEA: 0
ALLERGIC/IMMUNOLOGIC NEGATIVE: 1

## 2018-12-04 NOTE — PROGRESS NOTES
of Symbicort and increased use of albuterol   -educated on potential triggers/ avoidance of triggers  -encouraged to stop chewing tobacco   -recommend baseline PSG to r/o JOSEPH- patient declines testing at this time. -repeat FENO in 3 months on increased dose ICS, may need to consider other phenotypes such as eosinophilic asthma if no improvement in symptoms.      *Will see Vandana Clarita back in: 3 months    Electronically signed by MARCOS Mcghee CNP on 12/4/2018 at 4:55 PM  12/4/2018

## 2019-02-06 DIAGNOSIS — G20 PARKINSON DISEASE (HCC): ICD-10-CM

## 2019-02-06 RX ORDER — ROPINIROLE 12 MG/1
TABLET, FILM COATED, EXTENDED RELEASE ORAL
Qty: 90 TABLET | Refills: 0 | Status: SHIPPED | OUTPATIENT
Start: 2019-02-06 | End: 2019-03-28 | Stop reason: SDUPTHER

## 2019-03-04 ENCOUNTER — OFFICE VISIT (OUTPATIENT)
Dept: PULMONOLOGY | Age: 54
End: 2019-03-04
Payer: COMMERCIAL

## 2019-03-04 VITALS
HEART RATE: 76 BPM | SYSTOLIC BLOOD PRESSURE: 144 MMHG | TEMPERATURE: 97.6 F | WEIGHT: 235 LBS | OXYGEN SATURATION: 97 % | DIASTOLIC BLOOD PRESSURE: 86 MMHG | HEIGHT: 68 IN | BODY MASS INDEX: 35.61 KG/M2

## 2019-03-04 DIAGNOSIS — G20 PARKINSON DISEASE (HCC): ICD-10-CM

## 2019-03-04 DIAGNOSIS — G47.19 EXCESSIVE DAYTIME SLEEPINESS: ICD-10-CM

## 2019-03-04 DIAGNOSIS — E66.9 OBESITY (BMI 35.0-39.9 WITHOUT COMORBIDITY): ICD-10-CM

## 2019-03-04 DIAGNOSIS — J45.40 MODERATE PERSISTENT ASTHMA WITHOUT COMPLICATION: Primary | ICD-10-CM

## 2019-03-04 DIAGNOSIS — Z78.9 EXCESSIVE CAFFEINE INTAKE: ICD-10-CM

## 2019-03-04 DIAGNOSIS — F17.220 CHEWING TOBACCO NICOTINE DEPENDENCE WITHOUT COMPLICATION: ICD-10-CM

## 2019-03-04 PROCEDURE — 99214 OFFICE O/P EST MOD 30 MIN: CPT | Performed by: NURSE PRACTITIONER

## 2019-03-04 PROCEDURE — 95012 NITRIC OXIDE EXP GAS DETER: CPT | Performed by: NURSE PRACTITIONER

## 2019-03-04 RX ORDER — PREDNISONE 20 MG/1
20 TABLET ORAL 2 TIMES DAILY
Qty: 10 TABLET | Refills: 0 | Status: CANCELLED | OUTPATIENT
Start: 2019-03-04 | End: 2019-03-09

## 2019-03-04 ASSESSMENT — ENCOUNTER SYMPTOMS
ALLERGIC/IMMUNOLOGIC NEGATIVE: 1
DIARRHEA: 0
SHORTNESS OF BREATH: 1
STRIDOR: 0
BACK PAIN: 0
WHEEZING: 1
EYES NEGATIVE: 1
NAUSEA: 0
CHEST TIGHTNESS: 0
COUGH: 1

## 2019-03-26 ENCOUNTER — TELEPHONE (OUTPATIENT)
Dept: FAMILY MEDICINE CLINIC | Age: 54
End: 2019-03-26

## 2019-03-28 ENCOUNTER — TELEPHONE (OUTPATIENT)
Dept: FAMILY MEDICINE CLINIC | Age: 54
End: 2019-03-28

## 2019-03-28 DIAGNOSIS — R06.02 SHORTNESS OF BREATH: ICD-10-CM

## 2019-03-28 DIAGNOSIS — J45.41 RAD (REACTIVE AIRWAY DISEASE), MODERATE PERSISTENT, WITH ACUTE EXACERBATION: ICD-10-CM

## 2019-03-28 DIAGNOSIS — J45.20 MILD INTERMITTENT ASTHMA WITHOUT COMPLICATION: ICD-10-CM

## 2019-03-28 DIAGNOSIS — E78.00 PURE HYPERCHOLESTEROLEMIA: ICD-10-CM

## 2019-03-28 DIAGNOSIS — G20 PARKINSON DISEASE (HCC): ICD-10-CM

## 2019-03-28 DIAGNOSIS — E78.2 MIXED HYPERLIPIDEMIA: ICD-10-CM

## 2019-03-28 RX ORDER — OMEPRAZOLE 20 MG/1
20 TABLET, DELAYED RELEASE ORAL DAILY
Qty: 30 TABLET | Refills: 3 | Status: SHIPPED | OUTPATIENT
Start: 2019-03-28 | End: 2020-04-09 | Stop reason: SDUPTHER

## 2019-03-28 RX ORDER — ROPINIROLE 12 MG/1
TABLET, FILM COATED, EXTENDED RELEASE ORAL
Qty: 90 TABLET | Refills: 1 | Status: SHIPPED | OUTPATIENT
Start: 2019-03-28 | End: 2019-11-07 | Stop reason: SDUPTHER

## 2019-03-28 RX ORDER — ALBUTEROL SULFATE 90 UG/1
2 AEROSOL, METERED RESPIRATORY (INHALATION) EVERY 6 HOURS PRN
Qty: 1 INHALER | Refills: 3 | Status: SHIPPED | OUTPATIENT
Start: 2019-03-28 | End: 2020-02-27 | Stop reason: SDUPTHER

## 2019-03-28 RX ORDER — ADHESIVE TAPE 3"X 2.3 YD
TAPE, NON-MEDICATED TOPICAL
Qty: 60 TABLET | Refills: 5 | Status: SHIPPED | OUTPATIENT
Start: 2019-03-28

## 2019-03-28 RX ORDER — OMEGA-3-ACID ETHYL ESTERS 1 G/1
2 CAPSULE, LIQUID FILLED ORAL 2 TIMES DAILY
Qty: 180 CAPSULE | Refills: 1 | Status: SHIPPED | OUTPATIENT
Start: 2019-03-28

## 2019-03-28 RX ORDER — CARBIDOPA, LEVODOPA AND ENTACAPONE 25; 200; 100 MG/1; MG/1; MG/1
1 TABLET, FILM COATED ORAL 2 TIMES DAILY
Qty: 180 TABLET | Refills: 1 | Status: SHIPPED | OUTPATIENT
Start: 2019-03-28 | End: 2019-09-09 | Stop reason: SDUPTHER

## 2019-04-16 ENCOUNTER — OFFICE VISIT (OUTPATIENT)
Dept: PULMONOLOGY | Age: 54
End: 2019-04-16
Payer: COMMERCIAL

## 2019-04-16 VITALS
DIASTOLIC BLOOD PRESSURE: 78 MMHG | WEIGHT: 237.4 LBS | TEMPERATURE: 98.7 F | BODY MASS INDEX: 35.98 KG/M2 | HEART RATE: 87 BPM | SYSTOLIC BLOOD PRESSURE: 136 MMHG | OXYGEN SATURATION: 96 % | HEIGHT: 68 IN

## 2019-04-16 DIAGNOSIS — Z78.9 EXCESSIVE CAFFEINE INTAKE: ICD-10-CM

## 2019-04-16 DIAGNOSIS — R06.2 WHEEZING: ICD-10-CM

## 2019-04-16 DIAGNOSIS — J45.40 MODERATE PERSISTENT ASTHMA WITHOUT COMPLICATION: Primary | ICD-10-CM

## 2019-04-16 DIAGNOSIS — F17.220 CHEWING TOBACCO NICOTINE DEPENDENCE WITHOUT COMPLICATION: ICD-10-CM

## 2019-04-16 PROCEDURE — 99213 OFFICE O/P EST LOW 20 MIN: CPT | Performed by: NURSE PRACTITIONER

## 2019-04-16 ASSESSMENT — ENCOUNTER SYMPTOMS
NAUSEA: 0
WHEEZING: 1
EYES NEGATIVE: 1
CHEST TIGHTNESS: 0
COUGH: 0
SHORTNESS OF BREATH: 1
BACK PAIN: 0
DIARRHEA: 0
STRIDOR: 0
ALLERGIC/IMMUNOLOGIC NEGATIVE: 1

## 2019-04-16 NOTE — PROGRESS NOTES
[place] [facility]     Patient: Danielle Zee, 47 y.o.   : 1965  2019    Pt of Dr. Harsh Liz   Patient presents with    Follow-up     asthma 6 week. Labs- not done. DNQ     HPI  Philip Gonzalez is here for follow up for asthma. Failed to get his Mini Rast and CBC completed. Seen in March for follow up had been using Symbiocort but Nioxx level remained elevated, he was switched to Advair discuss 500/50. Has only used Ventolin once in last 1 month. Has seen improvement with Advair  C/o continued wheezing at rest, laying flat and when drinking beer. Denies seasonal allergies. Has drank same beer since age 24. Wheezing started 2 years ago. Denies cough  Does have occ. SOB   Non smoker   Has 1 dog in house, denies any family history asthma   [place] score of 15 and snoring, has declined PSG to R/o Cough   Drinks excessive amounts of caffeine daily, about 20+ cups- does not drink any water  PFT completed 2017:  FEV 1 82%   Past Medical hx   PMH:  Past Medical History:   Diagnosis Date    Parkinson's disease ([facility] [address]     9 years ago     SURGICAL HISTORY:  Past Surgical History:   Procedure Laterality Date    COLONOSCOPY  2017    HERNIA REPAIR  2016     SOCIAL HISTORY:  Social History     Tobacco Use    Smoking status: Never Smoker    Smokeless tobacco: Current User     Types: Chew   Substance Use Topics    Alcohol use:  Yes    Drug use: No     ALLERGIES:No Known Allergies  FAMILY HISTORY:  Family History   Problem Relation Age of Onset    Cancer Mother         breast x2, ovarian    Cancer Maternal Grandmother         unknown    Cancer Paternal Grandmother         unknown    Cancer Paternal Grandfather         unknown     CURRENT MEDICATIONS:  Current Outpatient Medications   Medication Sig Dispense Refill    rOPINIRole (REQUIP XL) 12 MG TB24 extended release tablet TAKE 1 TABLET BY MOUTH IN THE EVENING 90 tablet 1    omeprazole (PRILOSEC OTC) 20 MG tablet Take 1 tablet by mouth daily 30 tablet 3    carbidopa-levodopa-entacapone (STALEVO 100) -200 MG TABS Take 1 tablet by mouth 2 times daily 180 tablet 1    Magnesium Oxide 200 MG TABS Twice a day 60 tablet 5    albuterol sulfate HFA (VENTOLIN HFA) 108 (90 Base) MCG/ACT inhaler Inhale 2 puffs into the lungs every 6 hours as needed for Wheezing 1 Inhaler 3    Cholecalciferol (VITAMIN D3) 5000 units TABS One capsule daily 30 tablet 5    omega-3 acid ethyl esters (LOVAZA) 1 g capsule Take 2 capsules by mouth 2 times daily 180 capsule 1    Vitamins-Lipotropics (BALANCED B-100 COMPLEX CR) TBCR Take 1 tablet by mouth 2 times daily 120 tablet 5    fluticasone-salmeterol (ADVAIR DISKUS) 500-50 MCG/DOSE diskus inhaler Inhale 1 puff into the lungs 2 times daily 1 each 3    Spacer/Aero-Holding Chambers ELZBIETA 1 Device by Does not apply route daily as needed (1) 1 Device 0     No current facility-administered medications for this visit. Jenifer GALDAMEZ   Review of Systems   Constitutional: Negative for activity change, appetite change, chills, fever and unexpected weight change. HENT: Negative for congestion and postnasal drip. Eyes: Negative. Respiratory: Positive for shortness of breath and wheezing. Negative for cough, chest tightness and stridor. Cardiovascular: Negative for chest pain, palpitations and leg swelling. Gastrointestinal: Negative for diarrhea and nausea. Endocrine: Negative. Genitourinary: Negative. Musculoskeletal: Negative. Negative for arthralgias and back pain. Skin: Negative. Allergic/Immunologic: Negative. Neurological: Negative. Negative for dizziness and light-headedness. Psychiatric/Behavioral: Positive for sleep disturbance. All other systems reviewed and are negative.        Physical exam   /78 (Site: Left Upper Arm, Position: Sitting, Cuff Size: Large Adult)   Pulse 87   Temp 98.7 °F (37.1 °C)   Ht 5' 8\" (1.727 m)   Wt 237 lb 6.4 oz (107.7 kg)   SpO2 96% Comment: on RA  BMI 36.10 kg/m²        Physical Exam   Constitutional: He is oriented to person, place, and time. He appears well-developed and well-nourished. No distress. HENT:   Head: Normocephalic and atraumatic. Mouth/Throat: Oropharynx is clear and moist. No oropharyngeal exudate. Eyes: Conjunctivae are normal.   Neck: Normal range of motion. Neck supple. No tracheal deviation present. Cardiovascular: Normal rate, regular rhythm and normal heart sounds. No murmur heard. Pulmonary/Chest: Effort normal. No respiratory distress. He has wheezes (course rales scattered in all lung fields). He has no rales. He exhibits no tenderness. Abdominal: Soft. Bowel sounds are normal. He exhibits no distension. There is no tenderness. Musculoskeletal: Normal range of motion. He exhibits no edema. Neurological: He is alert and oriented to person, place, and time. Skin: Skin is warm and dry. Capillary refill takes less than 2 seconds. Psychiatric: He has a normal mood and affect. His behavior is normal. Judgment and thought content normal.   Nursing note and vitals reviewed. Test results   Lung Nodule Screening     [] Qualifies    [x]Does not qualify   [] Declined    [] Completed     no new testing   Assessment      Diagnosis Orders   1. Moderate persistent asthma without complication  Common Food Allergen Profile    RAST GR1   2. Excessive caffeine intake     3. Wheezing     4.  Chewing tobacco nicotine dependence without complication        Plan   -Recommend he stop drinking alcohol as this seems to be one of his asthma triggers, likely due to the sulfites which is common asthma trigger, this was discussed with patient.  -mini rast  -food allergen panel - call with results   -continue Advair Discus 500/50  -PRN Albuterol  -recommend he incorporate more water in his diet and less caffeine  -declines PSG     Will see Roel Suggs in: 6 months    Electronically signed by MARCOS Hwang CNP on 4/16/2019 at 3:54 PM

## 2019-07-05 ENCOUNTER — TELEPHONE (OUTPATIENT)
Dept: FAMILY MEDICINE CLINIC | Age: 54
End: 2019-07-05

## 2019-07-05 NOTE — TELEPHONE ENCOUNTER
Can  You call to get him in to see me - or a resident first and then me in the next few months? Thanks!

## 2019-07-11 DIAGNOSIS — G20 PARKINSON DISEASE (HCC): ICD-10-CM

## 2019-07-12 RX ORDER — BACLOFEN 10 MG/1
TABLET ORAL
Qty: 90 TABLET | Refills: 3 | Status: SHIPPED | OUTPATIENT
Start: 2019-07-12 | End: 2020-07-08

## 2019-09-09 RX ORDER — CARBIDOPA, LEVODOPA AND ENTACAPONE 25; 200; 100 MG/1; MG/1; MG/1
TABLET, FILM COATED ORAL
Qty: 180 TABLET | Refills: 1 | Status: SHIPPED | OUTPATIENT
Start: 2019-09-09 | End: 2020-03-19 | Stop reason: SDUPTHER

## 2019-11-27 ENCOUNTER — TELEPHONE (OUTPATIENT)
Dept: PULMONOLOGY | Age: 54
End: 2019-11-27

## 2019-12-03 ENCOUNTER — TELEPHONE (OUTPATIENT)
Dept: PULMONOLOGY | Age: 54
End: 2019-12-03

## 2020-02-27 ENCOUNTER — OFFICE VISIT (OUTPATIENT)
Dept: PULMONOLOGY | Age: 55
End: 2020-02-27
Payer: COMMERCIAL

## 2020-02-27 VITALS
HEIGHT: 68 IN | DIASTOLIC BLOOD PRESSURE: 82 MMHG | WEIGHT: 245.4 LBS | OXYGEN SATURATION: 96 % | BODY MASS INDEX: 37.19 KG/M2 | HEART RATE: 73 BPM | SYSTOLIC BLOOD PRESSURE: 132 MMHG

## 2020-02-27 PROCEDURE — 99213 OFFICE O/P EST LOW 20 MIN: CPT | Performed by: NURSE PRACTITIONER

## 2020-02-27 RX ORDER — ALBUTEROL SULFATE 90 UG/1
2 AEROSOL, METERED RESPIRATORY (INHALATION) EVERY 6 HOURS PRN
Qty: 1 INHALER | Refills: 3 | Status: SHIPPED | OUTPATIENT
Start: 2020-02-27 | End: 2020-04-09 | Stop reason: SDUPTHER

## 2020-02-27 ASSESSMENT — ENCOUNTER SYMPTOMS
EYES NEGATIVE: 1
WHEEZING: 0
ABDOMINAL PAIN: 0
COUGH: 0
DIARRHEA: 0
TROUBLE SWALLOWING: 0
NAUSEA: 0
SHORTNESS OF BREATH: 0
VOMITING: 0

## 2020-02-27 NOTE — PROGRESS NOTES
[place] [facility]     Patient: Valentin Mackey, 47 y.o.   : 1965  2020    Pt of Dr. Jose Elias Romero   Patient presents with    Follow-up     asthma        HPI  Adonay Mosquera is here for follow up for asthma. Needs refills. Using Advair 500/50 - only uses it once a day , knows he should be using twice day. Uses his rescue inhaler on average once a day. Wheezes almost all the time, normal for him. Declined mini rast allergy testing in past.   Denies any SOB, working full time with no difficulties. Sleeping well. No flare ups requiring antibiotics or steroids. Past Medical hx   PMH:  Past Medical History:   Diagnosis Date    Parkinson's disease ([address]     9 years ago     SURGICAL HISTORY:  Past Surgical History:   Procedure Laterality Date    COLONOSCOPY  2017    HERNIA REPAIR  2016     SOCIAL HISTORY:  Social History     Tobacco Use    Smoking status: Never Smoker    Smokeless tobacco: Current User     Types: Chew   Substance Use Topics    Alcohol use:  Yes    Drug use: No     ALLERGIES:No Known Allergies  FAMILY HISTORY:  Family History   Problem Relation Age of Onset    Cancer Mother         breast x2, ovarian    Cancer Maternal Grandmother         unknown    Cancer Paternal Grandmother         unknown    Cancer Paternal Grandfather         unknown     CURRENT MEDICATIONS:  Current Outpatient Medications   Medication Sig Dispense Refill    fluticasone-salmeterol (ADVAIR DISKUS) 500-50 MCG/DOSE diskus inhaler Inhale 1 puff into the lungs 2 times daily 1 each 11    albuterol sulfate HFA (VENTOLIN HFA) 108 (90 Base) MCG/ACT inhaler Inhale 2 puffs into the lungs every 6 hours as needed for Wheezing 1 Inhaler 3    rOPINIRole (REQUIP XL) 12 MG TB24 extended release tablet TAKE 1 TABLET BY MOUTH EVERY EVENING 90 tablet 1    carbidopa-levodopa-entacapone (STALEVO 100) -200 MG TABS TAKE 1 TABLET BY MOUTH TWICE DAILY 180 tablet 1    Vascular: No JVD. Cardiovascular:      Rate and Rhythm: Normal rate and regular rhythm. Heart sounds: No murmur. No friction rub. Pulmonary:      Effort: Pulmonary effort is normal. No accessory muscle usage or respiratory distress. Breath sounds: Normal breath sounds. No wheezing, rhonchi or rales. Chest:      Chest wall: No tenderness. Musculoskeletal:      Right lower leg: No edema. Left lower leg: No edema. Skin:     General: Skin is warm and dry. Capillary Refill: Capillary refill takes less than 2 seconds. Nails: There is no clubbing. Neurological:      Mental Status: He is alert. Psychiatric:         Mood and Affect: Mood normal.         Behavior: Behavior normal.         Thought Content: Thought content normal.         Judgment: Judgment normal.          Test results   Lung Nodule Screening     [] Qualifies    [x]Does not qualify   [] Declined    [] Completed    Assessment      Diagnosis Orders   1. Moderate intermittent asthma without complication     2. Obesity (BMI 35.0-39.9 without comorbidity)        Previous FENO level 26  Plan   Based on last FENO and continued wheezing, recommend he work harder on using his Advair BID not daily.    Continue PRN albuterol  Stay active, recommend weight loss  Avoidance of triggers    Will see Jeffrey Glass in: 1 year    Electronically signed by MARCOS Presley CNP on 2/27/2020 at 5:28 PM

## 2020-03-19 ENCOUNTER — TELEPHONE (OUTPATIENT)
Dept: FAMILY MEDICINE CLINIC | Age: 55
End: 2020-03-19

## 2020-03-19 RX ORDER — CARBIDOPA, LEVODOPA AND ENTACAPONE 25; 200; 100 MG/1; MG/1; MG/1
1 TABLET, FILM COATED ORAL 2 TIMES DAILY
Qty: 180 TABLET | Refills: 1 | Status: SHIPPED | OUTPATIENT
Start: 2020-03-19 | End: 2020-04-09 | Stop reason: SDUPTHER

## 2020-04-09 ENCOUNTER — TELEPHONE (OUTPATIENT)
Dept: FAMILY MEDICINE CLINIC | Age: 55
End: 2020-04-09

## 2020-04-09 RX ORDER — CARBIDOPA, LEVODOPA AND ENTACAPONE 25; 200; 100 MG/1; MG/1; MG/1
1 TABLET, FILM COATED ORAL 2 TIMES DAILY
Qty: 180 TABLET | Refills: 1 | Status: SHIPPED | OUTPATIENT
Start: 2020-04-09 | End: 2020-05-07 | Stop reason: SDUPTHER

## 2020-04-09 RX ORDER — ROPINIROLE 12 MG/1
TABLET, FILM COATED, EXTENDED RELEASE ORAL
Qty: 90 TABLET | Refills: 1 | Status: SHIPPED | OUTPATIENT
Start: 2020-04-09 | End: 2020-05-07 | Stop reason: SDUPTHER

## 2020-04-09 RX ORDER — OMEPRAZOLE 20 MG/1
20 TABLET, DELAYED RELEASE ORAL DAILY
Qty: 30 TABLET | Refills: 3 | Status: SHIPPED | OUTPATIENT
Start: 2020-04-09

## 2020-04-09 RX ORDER — ALBUTEROL SULFATE 90 UG/1
2 AEROSOL, METERED RESPIRATORY (INHALATION) EVERY 6 HOURS PRN
Qty: 1 INHALER | Refills: 3 | Status: SHIPPED | OUTPATIENT
Start: 2020-04-09 | End: 2021-03-01 | Stop reason: SDUPTHER

## 2020-05-07 ENCOUNTER — TELEPHONE (OUTPATIENT)
Dept: FAMILY MEDICINE CLINIC | Age: 55
End: 2020-05-07

## 2020-05-07 RX ORDER — CARBIDOPA, LEVODOPA AND ENTACAPONE 25; 200; 100 MG/1; MG/1; MG/1
1 TABLET, FILM COATED ORAL 2 TIMES DAILY
Qty: 180 TABLET | Refills: 1 | Status: SHIPPED | OUTPATIENT
Start: 2020-05-07 | End: 2020-11-04 | Stop reason: SDUPTHER

## 2020-05-07 RX ORDER — ROPINIROLE 12 MG/1
TABLET, FILM COATED, EXTENDED RELEASE ORAL
Qty: 90 TABLET | Refills: 1 | Status: SHIPPED | OUTPATIENT
Start: 2020-05-07 | End: 2020-11-04 | Stop reason: SDUPTHER

## 2020-07-08 RX ORDER — BACLOFEN 10 MG/1
TABLET ORAL
Qty: 90 TABLET | Refills: 3 | Status: SHIPPED | OUTPATIENT
Start: 2020-07-08 | End: 2021-07-15 | Stop reason: SDUPTHER

## 2020-11-04 ENCOUNTER — TELEPHONE (OUTPATIENT)
Dept: FAMILY MEDICINE CLINIC | Age: 55
End: 2020-11-04

## 2020-11-04 RX ORDER — ROPINIROLE 12 MG/1
TABLET, FILM COATED, EXTENDED RELEASE ORAL
Qty: 90 TABLET | Refills: 1 | Status: SHIPPED | OUTPATIENT
Start: 2020-11-04 | End: 2021-05-17 | Stop reason: SDUPTHER

## 2020-11-04 RX ORDER — CARBIDOPA, LEVODOPA AND ENTACAPONE 25; 200; 100 MG/1; MG/1; MG/1
1 TABLET, FILM COATED ORAL 2 TIMES DAILY
Qty: 180 TABLET | Refills: 1 | Status: SHIPPED | OUTPATIENT
Start: 2020-11-04 | End: 2021-05-17 | Stop reason: SDUPTHER

## 2021-03-01 ENCOUNTER — OFFICE VISIT (OUTPATIENT)
Dept: PULMONOLOGY | Age: 56
End: 2021-03-01
Payer: COMMERCIAL

## 2021-03-01 VITALS
HEART RATE: 72 BPM | SYSTOLIC BLOOD PRESSURE: 138 MMHG | TEMPERATURE: 97 F | DIASTOLIC BLOOD PRESSURE: 82 MMHG | BODY MASS INDEX: 37.86 KG/M2 | WEIGHT: 249 LBS | OXYGEN SATURATION: 97 %

## 2021-03-01 DIAGNOSIS — E66.9 OBESITY (BMI 35.0-39.9 WITHOUT COMORBIDITY): ICD-10-CM

## 2021-03-01 DIAGNOSIS — Q31.5 LARYNGEAL STRIDOR: ICD-10-CM

## 2021-03-01 PROCEDURE — 99213 OFFICE O/P EST LOW 20 MIN: CPT | Performed by: NURSE PRACTITIONER

## 2021-03-01 RX ORDER — PREDNISONE 20 MG/1
40 TABLET ORAL DAILY
Qty: 14 TABLET | Refills: 0 | Status: SHIPPED | OUTPATIENT
Start: 2021-03-01 | End: 2021-03-08

## 2021-03-01 RX ORDER — ALBUTEROL SULFATE 90 UG/1
2 AEROSOL, METERED RESPIRATORY (INHALATION) EVERY 6 HOURS PRN
Qty: 1 INHALER | Refills: 3 | Status: SHIPPED | OUTPATIENT
Start: 2021-03-01

## 2021-03-01 ASSESSMENT — ENCOUNTER SYMPTOMS
NAUSEA: 0
VOICE CHANGE: 1
TROUBLE SWALLOWING: 0
ABDOMINAL PAIN: 0
VOMITING: 0
SHORTNESS OF BREATH: 0
WHEEZING: 1
DIARRHEA: 0
EYES NEGATIVE: 1
COUGH: 0

## 2021-03-01 NOTE — PROGRESS NOTES
[facility]     Patient: Anat Magdaleno, 54 y.o.   : 1965  3/1/2021    Pt of Dr. Benjaman Epley   Patient presents with    Follow-up    Asthma        HPI  Andre Wade is here for 1 year  follow up for asthma  Chronic wheezing, at baseline  Sleeping well  No exacerbations. Working full time, no missed work for breathing issues. Audible wheezing, has seen SLP in past, follows with [facility] neurology for Parkinsons. Not on supplemental O2  Non smoker   Is using peak flow meter daily, has been decreased in past few days     Past Medical hx   PMH:  Past Medical History:   Diagnosis Date    Parkinson's disease ([address]     9 years ago     SURGICAL HISTORY:  Past Surgical History:   Procedure Laterality Date    COLONOSCOPY  2017    HERNIA REPAIR  2016    HIP SURGERY Right 10/2020    [place]     SOCIAL HISTORY:  Social History     Tobacco Use    Smoking status: Never Smoker    Smokeless tobacco: Current User     Types: Chew   Substance Use Topics    Alcohol use:  Yes    Drug use: No     ALLERGIES:No Known Allergies  FAMILY HISTORY:  Family History   Problem Relation Age of Onset    Cancer Mother         breast x2, ovarian    Cancer Maternal Grandmother         unknown    Cancer Paternal Grandmother         unknown    Cancer Paternal Grandfather         unknown     CURRENT MEDICATIONS:  Current Outpatient Medications   Medication Sig Dispense Refill    albuterol sulfate HFA (VENTOLIN HFA) 108 (90 Base) MCG/ACT inhaler Inhale 2 puffs into the lungs every 6 hours as needed for Wheezing 1 Inhaler 3    fluticasone-salmeterol (ADVAIR DISKUS) 500-50 MCG/DOSE diskus inhaler Inhale 1 puff into the lungs 2 times daily 1 each 11    predniSONE (DELTASONE) 20 MG tablet Take 2 tablets by mouth daily for 7 days 14 tablet 0    rOPINIRole (REQUIP XL) 12 MG TB24 extended release tablet TAKE 1 TABLET BY MOUTH EVERY EVENING 90 tablet 1    Lucias, denies any trouble chewing/ swallowing or choking.     If symptoms worsen do recommend another referral to SLP in future, he is agreeable  -continue Advair BID and PRN albuterol     Total time spent on encounter was 25 minutes    Will see [facility] in: 1 year    Electronically signed by MARCOS Dunn CNP on 3/1/2021 at 4:12 PM

## 2021-07-15 ENCOUNTER — TELEPHONE (OUTPATIENT)
Dept: FAMILY MEDICINE CLINIC | Age: 56
End: 2021-07-15

## 2021-07-15 DIAGNOSIS — G20 PARKINSON DISEASE (HCC): ICD-10-CM

## 2021-07-15 RX ORDER — BACLOFEN 10 MG/1
10 TABLET ORAL 2 TIMES DAILY
Qty: 180 TABLET | Refills: 1 | Status: SHIPPED | OUTPATIENT
Start: 2021-07-15

## 2021-07-15 RX ORDER — NAPROXEN 500 MG/1
500 TABLET ORAL 2 TIMES DAILY WITH MEALS
Qty: 60 TABLET | Refills: 1 | Status: SHIPPED | OUTPATIENT
Start: 2021-07-15

## 2021-07-15 NOTE — TELEPHONE ENCOUNTER
Having a lot of foot pain and running out of the baclofen for parkinsons    Will advise to take naprosyn as needed ans stretches - can also do PT if needed

## 2021-11-11 DIAGNOSIS — G20 PARKINSON DISEASE (HCC): ICD-10-CM

## 2021-11-12 RX ORDER — ROPINIROLE 12 MG/1
TABLET, FILM COATED, EXTENDED RELEASE ORAL
Qty: 90 TABLET | Refills: 1 | OUTPATIENT
Start: 2021-11-12

## 2021-11-15 RX ORDER — CARBIDOPA, LEVODOPA AND ENTACAPONE 25; 200; 100 MG/1; MG/1; MG/1
1 TABLET, FILM COATED ORAL 2 TIMES DAILY
Qty: 180 TABLET | Refills: 1 | Status: SHIPPED | OUTPATIENT
Start: 2021-11-15 | End: 2022-05-27

## 2021-11-15 RX ORDER — ROPINIROLE 12 MG/1
TABLET, FILM COATED, EXTENDED RELEASE ORAL
Qty: 90 TABLET | Refills: 1 | Status: SHIPPED | OUTPATIENT
Start: 2021-11-15 | End: 2022-05-03

## 2022-02-10 PROBLEM — Q31.5 LARYNGEAL STRIDOR: Status: ACTIVE | Noted: 2022-02-10

## 2022-02-10 PROBLEM — J45.40 MODERATE PERSISTENT ASTHMA WITHOUT COMPLICATION: Status: ACTIVE | Noted: 2022-02-10

## 2022-02-10 PROBLEM — E66.9 OBESITY (BMI 35.0-39.9 WITHOUT COMORBIDITY): Status: ACTIVE | Noted: 2022-02-10

## 2022-04-29 DIAGNOSIS — G20 PARKINSON DISEASE (HCC): ICD-10-CM

## 2022-04-29 NOTE — TELEPHONE ENCOUNTER
Last visit- Visit date not found  Next visit- Visit date not found    Requested Prescriptions     Pending Prescriptions Disp Refills    rOPINIRole (REQUIP XL) 12 MG TB24 extended release tablet [Pharmacy Med Name: ropinirole ER 12 mg tablet,extended release 24 hr] 90 tablet 1     Sig: TAKE 1 TABLET BY MOUTH EVERY EVENING

## 2022-05-03 RX ORDER — ROPINIROLE 12 MG/1
TABLET, FILM COATED, EXTENDED RELEASE ORAL
Qty: 90 TABLET | Refills: 1 | Status: SHIPPED | OUTPATIENT
Start: 2022-05-03

## 2022-05-06 ENCOUNTER — TELEPHONE (OUTPATIENT)
Dept: FAMILY MEDICINE CLINIC | Age: 57
End: 2022-05-06

## 2022-05-06 NOTE — TELEPHONE ENCOUNTER
Can you make him an appointment for 4 pm on Lety Bell's schedule on the 26th of May? Thanks very much!

## 2022-05-27 RX ORDER — CARBIDOPA, LEVODOPA AND ENTACAPONE 25; 200; 100 MG/1; MG/1; MG/1
TABLET, FILM COATED ORAL
Qty: 180 TABLET | Refills: 1 | Status: SHIPPED | OUTPATIENT
Start: 2022-05-27

## 2022-05-27 NOTE — TELEPHONE ENCOUNTER
Patient's last appointment was : Visit date not found  Patient's next appointment is : No future appointments.   Last refilled: 11/15/2021    No results found for: LABA1C  Lab Results   Component Value Date    CHOL 228 10/27/2018    TRIG 144 10/27/2018    HDL 53 10/27/2018    LDLCALC 148 10/27/2018     Lab Results   Component Value Date    K 4.4 10/27/2018    CREATININE 0.86 10/27/2018     No results found for: TSH, R9JLIMU, E7AYBTE, THYROIDAB, FT3, T4FREE  No results found for: WBC, HGB, HCT, MCV, PLT